# Patient Record
Sex: FEMALE | Employment: FULL TIME | URBAN - METROPOLITAN AREA
[De-identification: names, ages, dates, MRNs, and addresses within clinical notes are randomized per-mention and may not be internally consistent; named-entity substitution may affect disease eponyms.]

---

## 2017-04-04 ENCOUNTER — OFFICE VISIT (OUTPATIENT)
Dept: OPTOMETRY | Facility: CLINIC | Age: 42
End: 2017-04-04
Payer: COMMERCIAL

## 2017-04-04 DIAGNOSIS — H52.03 HYPEROPIA, BILATERAL: Primary | ICD-10-CM

## 2017-04-04 DIAGNOSIS — H52.4 PRESBYOPIA: ICD-10-CM

## 2017-04-04 PROCEDURE — 92015 DETERMINE REFRACTIVE STATE: CPT | Performed by: OPTOMETRIST

## 2017-04-04 PROCEDURE — 92004 COMPRE OPH EXAM NEW PT 1/>: CPT | Performed by: OPTOMETRIST

## 2017-04-04 ASSESSMENT — TONOMETRY
OS_IOP_MMHG: 13
OD_IOP_MMHG: 13
IOP_METHOD: APPLANATION

## 2017-04-04 ASSESSMENT — REFRACTION_MANIFEST
OS_CYLINDER: SPHERE
OD_SPHERE: +1.50
OD_CYLINDER: SPHERE
METHOD_AUTOREFRACTION: 1
OS_ADD: +1.50
OD_SPHERE: +1.50
OS_SPHERE: +1.25
OD_ADD: +1.50
OS_SPHERE: +1.00

## 2017-04-04 ASSESSMENT — KERATOMETRY
OS_AXISANGLE2_DEGREES: 25
OS_K1POWER_DIOPTERS: 44.50
OD_K1POWER_DIOPTERS: 44.50
OD_K2POWER_DIOPTERS: 45.25
OD_AXISANGLE2_DEGREES: 155
OS_K2POWER_DIOPTERS: 45.25

## 2017-04-04 ASSESSMENT — VISUAL ACUITY
OS_SC: 20/70
OD_SC: 20/30
OD_SC+: -1
METHOD: SNELLEN - LINEAR
OS_SC+: -1
OD_SC: 20/70-2
OS_SC: 20/25

## 2017-04-04 ASSESSMENT — CONF VISUAL FIELD
OS_NORMAL: 1
OD_NORMAL: 1

## 2017-04-04 ASSESSMENT — EXTERNAL EXAM - LEFT EYE: OS_EXAM: NORMAL

## 2017-04-04 ASSESSMENT — SLIT LAMP EXAM - LIDS
COMMENTS: NORMAL
COMMENTS: NORMAL

## 2017-04-04 ASSESSMENT — CUP TO DISC RATIO
OD_RATIO: 0.2
OS_RATIO: 0.2

## 2017-04-04 ASSESSMENT — EXTERNAL EXAM - RIGHT EYE: OD_EXAM: NORMAL

## 2017-04-04 NOTE — MR AVS SNAPSHOT
"              After Visit Summary   4/4/2017    Inocente Gutierrez    MRN: [id number]           Patient Information     Date Of Birth          1975        Visit Information        Provider Department      4/4/2017 2:00 PM Suzette Lawson, K[facility]        Today's Diagnoses     Hyperopia, bilateral    -  1    Presbyopia          Care Instructions    Patient was advised of today's exam findings.  Reading glasses advised  Return in 1 year for eye exam    Suzette Lawson O.D.  [facility]   [address] [place] [address]  [phone]          Follow-ups after your visit        Who to contact     If you have questions or need follow up information about today's clinic visit or your schedule please contact [facility] directly at [phone].  Normal or non-critical lab and imaging results will be communicated to you by MyChart, letter or phone within 4 business days after the clinic has received the results. If you do not hear from us within 7 days, please contact the clinic through MyChart or phone. If you have a critical or abnormal lab result, we will notify you by phone as soon as possible.  Submit refill requests through [employer] or call your pharmacy and they will forward the refill request to us. Please allow 3 business days for your refill to be completed.          Additional Information About Your Visit        MyChart Information     [employer] lets you send messages to your doctor, view your test results, renew your prescriptions, schedule appointments and more. To sign up, go to [email or web address] . Click on \"Log in\" on the left side of the screen, which will take you to the Welcome page. Then click on \"Sign up Now\" on the right side of the page.     You will be asked to enter the access code listed below, as well as some personal information. Please follow the directions to create your username and password.     Your access code is: " [email or web address]  Expires: 7/3/2017  3:06 PM     Your access code will  in 90 days. If you need help or a new code, please call your [facility] or [phone].        Care EveryWhere ID     This is your Care EveryWhere ID. This could be used by other organizations to access your [place] medical records  [id number]         Blood Pressure from Last 3 Encounters:   10/27/11 143/86    Weight from Last 3 Encounters:   No data found for Wt              Today, you had the following     No orders found for display       Primary Care Provider Office Phone # Fax #    [facility] [phone] [address] [facility] [address]        Thank you!     Thank you for choosing [facility]  for your care. Our goal is always to provide you with excellent care. Hearing back from our patients is one way we can continue to improve our services. Please take a few minutes to complete the written survey that you may receive in the mail after your visit with us. Thank you!             Your Updated Medication List - Protect others around you: Learn how to safely use, store and throw away your medicines at www.disposemymeds.org.          This list is accurate as of: 17  3:06 PM.  Always use your most recent med list.                   Brand Name Dispense Instructions for use    PROZAC 40 MG capsule   Generic drug:  FLUoxetine      Take 40 mg by mouth daily.

## 2017-04-04 NOTE — PATIENT INSTRUCTIONS
Patient was advised of today's exam findings.  Reading glasses advised  Return in 1 year for eye exam    Suzette Lawson O.D.  [facility]   [address] [place], [place] [id number] [phone]

## 2017-04-20 NOTE — PROGRESS NOTES
SUBJECTIVE:                                                    Inocente Gutierrez is a 41 year old female who presents to clinic today for the following health issues:    New Patient/Transfer of Care discuss med check and refill .      Depression:  Chronic. Stable.   Prozac dose works very well. 30 mg daily for a long time.  Stable for years per patient. This dose seems to work the best for her.   Denies suicidal or homicidal thoughts.  Patient instructed to go to the emergency room or call 911 if these occur.      Wrist pain:  Was told to have surgery for carpel surgery--but insurance lapsed.  She has been having ongoing pain and symptoms. Already had nerve testing done/EMG.   Needs referral. She would like to be assessed for surgery.  Failed braces and conservative treatments.   Is both wrists. Pain for many years.   Takes ibuprofen for back pain, wrist pain, or painful period cramping.  Would like refilled.       SH:  3 pregnancies.   Tobacco use: Smokes about a pack every 3 days.  Has tried to quit before.  Has cut back on her smoking.     Has quit for years at a time. Does not feels he needs to quit.  Can quit when she wants to.   Mom has copd. Patient is aware of reasons to quit.       Pap--has been several years. Is due. Will schedule.           Problem list and histories reviewed & adjusted, as indicated.  Additional history: as documented    Patient Active Problem List   Diagnosis     Mild episode of recurrent major depressive disorder (H)     Pain in both wrists     Past Surgical History:   Procedure Laterality Date     APPENDECTOMY       TONSILLECTOMY         Social History   Substance Use Topics     Smoking status: Current Every Day Smoker     Packs/day: 1.00     Years: 20.00     Types: Cigarettes     Smokeless tobacco: Not on file      Comment: per pt smokes a pack every 3 days     Alcohol use Yes      Comment: OCC     Family History   Problem Relation Age of Onset     DIABETES Mother      Chronic  "Obstructive Pulmonary Disease Mother      Parkinsonism Mother      greene     Glaucoma No family hx of      Macular Degeneration No family hx of          Current Outpatient Prescriptions   Medication Sig Dispense Refill     FLUoxetine (PROZAC) 10 MG capsule Take 1 capsule (10 mg) by mouth daily Take 3 capsules once daily. 30 mg total. 90 capsule 5     ibuprofen (ADVIL/MOTRIN) 800 MG tablet Take 1 tablet (800 mg) by mouth every 8 hours as needed for moderate pain 90 tablet 11     [DISCONTINUED] FLUoxetine (PROZAC) 10 MG capsule Take 10 mg by mouth daily Per pt takes 30 mg, takes 3 tabs all at once.       [DISCONTINUED] FLUoxetine (PROZAC) 40 MG capsule Take 40 mg by mouth daily.       No Known Allergies    ROS:  Constitutional, HEENT, cardiovascular, pulmonary, GI, , musculoskeletal, neuro, skin, endocrine and psych systems are negative, except as otherwise noted.    OBJECTIVE:                                                    /84  Pulse 94  Temp 98.7  F (37.1  C) (Oral)  Ht 4' 11\" (1.499 m)  Wt 127 lb (57.6 kg)  SpO2 98%  Breastfeeding? No  BMI 25.65 kg/m2  Body mass index is 25.65 kg/(m^2).  GENERAL: healthy, alert and no distress  RESP: lungs clear to auscultation - no rales, rhonchi or wheezes  CV: regular rate and rhythm, normal S1 S2, no S3 or S4, no murmur, click or rub, no peripheral edema and peripheral pulses strong  MS: no gross musculoskeletal defects noted, no edema  SKIN: no suspicious lesions or rashes  NEURO: Normal strength and tone, mentation intact and speech normal  PSYCH: mentation appears normal, affect normal/bright         ASSESSMENT/PLAN:                                                    ASSESSMENT / PLAN:  (F33.0) Mild episode of recurrent major depressive disorder (H)  (primary encounter diagnosis)  Comment: stable  Plan: FLUoxetine (PROZAC) 10 MG capsule        Recheck 6 months via phone or mychart or mail (phq9 score)  Recheck 12 months clinic, sooner if needed      (N94.6) " Dysmenorrhea  Comment:   Plan: ibuprofen (ADVIL/MOTRIN) 800 MG tablet            (M25.531,  M25.532) Pain in both wrists  Comment: see hpi  Plan: ORTHOPEDICS ADULT REFERRAL                Christa Rajan PA-C  [facility]

## 2017-04-21 ENCOUNTER — OFFICE VISIT (OUTPATIENT)
Dept: FAMILY MEDICINE | Facility: CLINIC | Age: 42
End: 2017-04-21
Payer: COMMERCIAL

## 2017-04-21 VITALS
BODY MASS INDEX: 25.6 KG/M2 | DIASTOLIC BLOOD PRESSURE: 84 MMHG | OXYGEN SATURATION: 98 % | HEIGHT: 59 IN | HEART RATE: 94 BPM | SYSTOLIC BLOOD PRESSURE: 136 MMHG | TEMPERATURE: 98.7 F | WEIGHT: 127 LBS

## 2017-04-21 DIAGNOSIS — F33.0 MILD EPISODE OF RECURRENT MAJOR DEPRESSIVE DISORDER (H): Primary | ICD-10-CM

## 2017-04-21 DIAGNOSIS — M25.532 PAIN IN BOTH WRISTS: ICD-10-CM

## 2017-04-21 DIAGNOSIS — N94.6 DYSMENORRHEA: ICD-10-CM

## 2017-04-21 DIAGNOSIS — M25.531 PAIN IN BOTH WRISTS: ICD-10-CM

## 2017-04-21 PROCEDURE — 99203 OFFICE O/P NEW LOW 30 MIN: CPT | Performed by: PHYSICIAN ASSISTANT

## 2017-04-21 RX ORDER — IBUPROFEN 800 MG/1
800 TABLET, FILM COATED ORAL EVERY 8 HOURS PRN
Qty: 90 TABLET | Refills: 11 | Status: SHIPPED | OUTPATIENT
Start: 2017-04-21 | End: 2018-05-08

## 2017-04-21 RX ORDER — IBUPROFEN 800 MG/1
800 TABLET, FILM COATED ORAL EVERY 8 HOURS PRN
COMMUNITY
End: 2017-04-21

## 2017-04-21 RX ORDER — FLUOXETINE 10 MG/1
10 CAPSULE ORAL DAILY
COMMUNITY
End: 2017-04-21

## 2017-04-21 RX ORDER — FLUOXETINE 10 MG/1
10 CAPSULE ORAL DAILY
Qty: 90 CAPSULE | Refills: 5 | Status: SHIPPED | OUTPATIENT
Start: 2017-04-21 | End: 2018-04-30

## 2017-04-21 NOTE — NURSING NOTE
"Chief Complaint   Patient presents with     Establish Care     establish care with PCP prozac, ibuprofen med check and refill       Initial /86  Pulse 94  Temp 98.7  F (37.1  C) (Oral)  Ht 4' 11\" (1.499 m)  Wt 127 lb (57.6 kg)  SpO2 98%  Breastfeeding? No  BMI 25.65 kg/m2 Estimated body mass index is 25.65 kg/(m^2) as calculated from the following:    Height as of this encounter: 4' 11\" (1.499 m).    Weight as of this encounter: 127 lb (57.6 kg).  Medication Reconciliation: complete      Danna Clay MA    "

## 2017-04-21 NOTE — MR AVS SNAPSHOT
After Visit Summary   4/21/2017    Inocente Gutierrez    MRN: [id number]           Patient Information     Date Of Birth          1975        Visit Information        Provider Department      4/21/2017 1:40 PM Christa Rajan PA-C [facility]        Today's Diagnoses     Mild episode of recurrent major depressive disorder (H)    -  1    Dysmenorrhea        Pain in both wrists           Follow-ups after your visit        Additional Services     ORTHOPEDICS ADULT REFERRAL       Your provider has referred you to: [facility]: [facility] [phone]   [email or web address]    Please be aware that coverage of these services is subject to the terms and limitations of your health insurance plan.  Call member services at your health plan with any benefit or coverage questions.      Please bring the following to your appointment:    >>   Any x-rays, CTs or MRIs which have been performed.  Contact the facility where they were done to arrange for  prior to your scheduled appointment.    >>   List of current medications   >>   This referral request   >>   Any documents/labs given to you for this referral                  Your next 10 appointments already scheduled     May 01, 2017 11:30 AM CDT   PHYSICAL with Deangelo Cedillo MD   [facility]    [address] [address] [phone]              Who to contact     If you have questions or need follow up information about today's clinic visit or your schedule please contact [facility] directly at [phone].  Normal or non-critical lab and imaging results will be communicated to you by MyChart, letter or phone within 4 business days after the clinic has received the results. If you do not hear from us within 7 days, please contact the clinic through MyChart or phone. If you have a critical or abnormal lab result, we will  "notify you by phone as soon as possible.  Submit refill requests through [employer] or call your pharmacy and they will forward the refill request to us. Please allow 3 business days for your refill to be completed.          Additional Information About Your Visit        [employer] Information     [employer] lets you send messages to your doctor, view your test results, renew your prescriptions, schedule appointments and more. To sign up, go to [email or web address] . Click on \"Log in\" on the left side of the screen, which will take you to the Welcome page. Then click on \"Sign up Now\" on the right side of the page.     You will be asked to enter the access code listed below, as well as some personal information. Please follow the directions to create your username and password.     Your access code is: [email or web address]  Expires: 7/3/2017  3:06 PM     Your access code will  in 90 days. If you need help or a new code, please call your Mobile clinic or [phone].        Care EveryWhere ID     This is your Care EveryWhere ID. This could be used by other organizations to access your [place] medical records  [id number]        Your Vitals Were     Pulse Temperature Height Pulse Oximetry Breastfeeding? BMI (Body Mass Index)    94 98.7  F (37.1  C) (Oral) 4' 11\" (1.499 m) 98% No 25.65 kg/m2       Blood Pressure from Last 3 Encounters:   17 162/86   10/27/11 143/86    Weight from Last 3 Encounters:   17 127 lb (57.6 kg)              We Performed the Following     ORTHOPEDICS ADULT REFERRAL          Today's Medication Changes          These changes are accurate as of: 17  2:40 PM.  If you have any questions, ask your nurse or doctor.               These medicines have changed or have updated prescriptions.        Dose/Directions    FLUoxetine 10 MG capsule   Commonly known as:  PROZAC   This may have changed:    - additional instructions  - Another medication with the same name was removed. Continue taking " this medication, and follow the directions you see here.   Used for:  Mild episode of recurrent major depressive disorder (H)   Changed by:  Christa Rajan PA-C        Dose:  10 mg   Take 1 capsule (10 mg) by mouth daily Take 3 capsules once daily. 30 mg total.   Quantity:  90 capsule   Refills:  5            Where to get your medicines      These medications were sent to [place] [address] [place], [place] - [address]  [address] [address]     Phone:  [phone]     FLUoxetine 10 MG capsule    ibuprofen 800 MG tablet                Primary Care Provider Office Phone # Fax #    [facility] [phone] [address] [facility] [address]        Thank you!     Thank you for choosing [facility]  for your care. Our goal is always to provide you with excellent care. Hearing back from our patients is one way we can continue to improve our services. Please take a few minutes to complete the written survey that you may receive in the mail after your visit with us. Thank you!             Your Updated Medication List - Protect others around you: Learn how to safely use, store and throw away your medicines at www.disposemymeds.org.          This list is accurate as of: 4/21/17  2:40 PM.  Always use your most recent med list.                   Brand Name Dispense Instructions for use    FLUoxetine 10 MG capsule    PROZAC    90 capsule    Take 1 capsule (10 mg) by mouth daily Take 3 capsules once daily. 30 mg total.       ibuprofen 800 MG tablet    ADVIL/MOTRIN    90 tablet    Take 1 tablet (800 mg) by mouth every 8 hours as needed for moderate pain

## 2017-04-25 ENCOUNTER — TELEPHONE (OUTPATIENT)
Dept: FAMILY MEDICINE | Facility: CLINIC | Age: 42
End: 2017-04-25

## 2017-04-25 DIAGNOSIS — F33.0 MILD EPISODE OF RECURRENT MAJOR DEPRESSIVE DISORDER (H): Primary | ICD-10-CM

## 2017-04-25 NOTE — TELEPHONE ENCOUNTER
Patient was in to see Christa Rajan and was diagnosed with Depression. Please complete PHA-9 and DAP. Thank you

## 2017-04-25 NOTE — TELEPHONE ENCOUNTER
Left message on answering machine for patient to call back.  [phone]  Tata BLANCHARDN, RN, CPN

## 2017-04-26 ASSESSMENT — PATIENT HEALTH QUESTIONNAIRE - PHQ9: SUM OF ALL RESPONSES TO PHQ QUESTIONS 1-9: 3

## 2017-04-26 NOTE — PROGRESS NOTES
"SUBJECTIVE:  Inocente Gutierrez is a 41 year old female who is seen in consultation at the request of Christa Law, for Bilateral hand numbness and tingling and pain x 3 years. L>R. She has tried bracing at night. Suspected cause: Due to work activities. She is currently laid off.      Job description: Construction     Present symptoms: numbness in bilateral radial 4 fingers and can extend up the arm, bilateral middle fingers will turn \"white\" occasionally not necessarily when its cold, painful when cold, dropping objects, weakness.      Patients past medical, surgical, social and family histories reviewed.      Orthopedic PMH: none    PAST MEDICAL HISTORY:   Past Medical History:   Diagnosis Date     Depressive disorder      Hypertension      Uncomplicated asthma      PAST SURGICAL HISTORY:   Past Surgical History:   Procedure Laterality Date     APPENDECTOMY       TONSILLECTOMY       FAMILY HISTORY:   Family History   Problem Relation Age of Onset     DIABETES Mother      Chronic Obstructive Pulmonary Disease Mother      Parkinsonism Mother      greene     Glaucoma No family hx of      Macular Degeneration No family hx of      SOCIAL HISTORY:   Social History   Substance Use Topics     Smoking status: Current Every Day Smoker     Packs/day: 1.00     Years: 20.00     Types: Cigarettes     Smokeless tobacco: Not on file      Comment: per pt smokes a pack every 3 days     Alcohol use Yes      Comment: OCC     REVIEW OF SYSTEMS:  CONSTITUTIONAL:  NEGATIVE for fever, chills, change in weight  INTEGUMENTARY/SKIN:  NEGATIVE for worrisome rashes, moles or lesions  EYES:  NEGATIVE for vision changes or irritation  ENT/MOUTH:  NEGATIVE for ear, mouth and throat problems  RESP:  NEGATIVE for significant cough or SOB  BREAST:  NEGATIVE for masses, tenderness or discharge  CV:  NEGATIVE for chest pain, palpitations or peripheral edema  GI:  NEGATIVE for nausea, abdominal pain, heartburn, or change in bowel habits  :  Negative "   MUSCULOSKELETAL:  See HPI above  NEURO:  NEGATIVE for weakness, dizziness or paresthesias  ENDOCRINE:  NEGATIVE for temperature intolerance, skin/hair changes  HEME/ALLERGY/IMMUNE:  NEGATIVE for bleeding problems  PSYCHIATRIC:  NEGATIVE for changes in mood or affect    EXAM:  /83 (BP Location: Right arm, Patient Position: Chair, Cuff Size: Adult Regular)  Pulse 79  Resp 14  Wt 59.3 kg (130 lb 12.8 oz)  SpO2 100%  BMI 26.42 kg/m2  GENERAL APPEARANCE: healthy, alert and no distress   GAIT: NORMAL  PSYCH:  mentation appears normal and affect normal/bright  SKIN: no suspicious lesions or rashes  NEURO: Negative thenar fasciculations (bilateral).  Thenar atrophy: none (bilateral).   Sensation diminished in radial 3 digits and half of the 4th digit (L), diminished in index finger only (R)   reflexes normal in upper extremities.   Vascular: Good capp refill and pulses    MUSCULOSKELETAL:    Range of Motion wrist, digits: All Normal  Special tests: Tinel's positive (bilateral), Phalen's positive after 10 seconds (bilateral).    EMG: Obtained on 4/15/15 from [facility] showed: moderate bilateral carpal tunnel syndrome.     ASSESSMENT/PLAN:  1. Moderate bilateral carpal tunnel syndrome     We talked about the options, which included activity modification,splinting, corticosteroid injection, medrol dose pack, and CTR. We talked about the nature of the procedure, risks, and recovery time. We decided to proceed with bilateral corticosteroid injection under US guidance.  She was sent for this.    Return to clinic PRN.    TADEO Christy MD  Dept. Orthopedic Surgery  [facility]    This document serves as a record of the services and decisions personally performed and made by Dr. TADEO Christy MD. It was created on his behalf by Sonja Handley, a trained medical scribe. The creation of this record is based on the provider's personal observations and the statements of the patient. This document has been  checked and approved by the attending provider.   Sonja Handley April 27, 2017 11:04 AM

## 2017-04-27 ENCOUNTER — OFFICE VISIT (OUTPATIENT)
Dept: ORTHOPEDICS | Facility: CLINIC | Age: 42
End: 2017-04-27
Payer: COMMERCIAL

## 2017-04-27 VITALS
DIASTOLIC BLOOD PRESSURE: 83 MMHG | BODY MASS INDEX: 26.42 KG/M2 | SYSTOLIC BLOOD PRESSURE: 149 MMHG | OXYGEN SATURATION: 100 % | RESPIRATION RATE: 14 BRPM | HEART RATE: 79 BPM | WEIGHT: 130.8 LBS

## 2017-04-27 DIAGNOSIS — R20.2 NUMBNESS AND TINGLING IN BOTH HANDS: Primary | ICD-10-CM

## 2017-04-27 DIAGNOSIS — G56.03 BILATERAL CARPAL TUNNEL SYNDROME: ICD-10-CM

## 2017-04-27 DIAGNOSIS — R20.0 NUMBNESS AND TINGLING IN BOTH HANDS: Primary | ICD-10-CM

## 2017-04-27 PROCEDURE — 99243 OFF/OP CNSLTJ NEW/EST LOW 30: CPT | Performed by: ORTHOPAEDIC SURGERY

## 2017-04-27 ASSESSMENT — PAIN SCALES - GENERAL: PAINLEVEL: NO PAIN (1)

## 2017-04-27 NOTE — MR AVS SNAPSHOT
After Visit Summary   4/27/2017    Inocente Gutierrez    MRN: [id number]           Patient Information     Date Of Birth          1975        Visit Information        Provider Department      4/27/2017 10:15 AM Lucius Christy MD [facility]        Today's Diagnoses     Numbness and tingling in both hands    -  1    Bilateral carpal tunnel syndrome          Care Instructions    Please remember to call and schedule a follow up appointment if one was recommended at your earliest convenience.   Orthopedics CLINIC HOURS TELEPHONE NUMBER   Lucius Christy M.D.      Hamida Winslow LPN Tuesday 8 am -5 pm    1st & 3rd Wednesday  1-4pm [place] 2nd & 4th Wednesday  8 am -11 pm / [place]  1-4pm / [place] Thursday 8 am -5 pm   Specialty schedulers:   [phone] to make an appointment with any Specialty Provider.   Main Clinic:   [phone] to make an appointment with your primary provider   Urgent Care locations:    [facility] Monday-Friday 5 pm - 9 pm  Saturday-Sunday 9 am -5pm      Monday-Friday 11 am - 9 pm  Saturday 9 am - 5 pm [phone]     If SURGERY has been recommended, please call our Specialty Schedulers at [phone] to schedule your procedure.    If you need a medication refill, please contact your pharmacy. Please allow 3 business days for your refill to be completed.  Use [employer] (secure e-mail communication and access to your chart) to send a message or to make an appointment. Please ask how you can sign up for [employer].  Hamida Winslow LPN            Follow-ups after your visit        Additional Services     ORTHO  REFERRAL       [facility] is referring you to the Orthopedic  Services at [facility].       The  Representative will assist you in the coordination of your Orthopedic and Musculoskeletal Care as prescribed by your physician.    The   Representative will call you within 1 business day to help schedule your appointment, or you may contact the Pending sale to [facility] Representative at:    All areas ~ [phone]     Type of Referral : Surgical / Specialist  Dr. Tyrese Bocanegra @ [facility]      Timeframe requested: 1 - 2 weeks  Procedure requested: Bilateral steroid injection of carpal tunnels under image guidance.    Coverage of these services is subject to the terms and limitations of your health insurance plan.  Please call member services at your health plan with any benefit or coverage questions.      If X-rays, CT or MRI's have been performed, please contact the facility where they were done to arrange for , prior to your scheduled appointment.  Please bring this referral request to your appointment and present it to your specialist.                  Your next 10 appointments already scheduled     May 01, 2017 11:30 AM CDT   PHYSICAL with Deangelo Cedillo MD   [facility] ([facility]    [address] [address] [phone]              Who to contact     If you have questions or need follow up information about today's clinic visit or your schedule please contact [facility] directly at [phone].  Normal or non-critical lab and imaging results will be communicated to you by MyChart, letter or phone within 4 business days after the clinic has received the results. If you do not hear from us within 7 days, please contact the clinic through MyChart or phone. If you have a critical or abnormal lab result, we will notify you by phone as soon as possible.  Submit refill requests through [employer] or call your pharmacy and they will forward the refill request to us. Please allow 3 business days for your refill to be completed.          Additional Information About Your Visit        Care EveryWhere ID     This is your Care EveryWhere ID. This could be used by other organizations  to access your [place] medical records  [id number]        Your Vitals Were     Pulse Respirations Pulse Oximetry BMI (Body Mass Index)          79 14 100% 26.42 kg/m2         Blood Pressure from Last 3 Encounters:   04/27/17 149/83   04/21/17 136/84   10/27/11 143/86    Weight from Last 3 Encounters:   04/27/17 59.3 kg (130 lb 12.8 oz)   04/21/17 57.6 kg (127 lb)              We Performed the Following     ORTHO  REFERRAL        Primary Care Provider Office Phone # Fax #    [facility] [phone] [address] [facility] [address]        Thank you!     Thank you for choosing [facility]  for your care. Our goal is always to provide you with excellent care. Hearing back from our patients is one way we can continue to improve our services. Please take a few minutes to complete the written survey that you may receive in the mail after your visit with us. Thank you!             Your Updated Medication List - Protect others around you: Learn how to safely use, store and throw away your medicines at www.disposemymeds.org.          This list is accurate as of: 4/27/17 12:46 PM.  Always use your most recent med list.                   Brand Name Dispense Instructions for use    FLUoxetine 10 MG capsule    PROZAC    90 capsule    Take 1 capsule (10 mg) by mouth daily Take 3 capsules once daily. 30 mg total.       ibuprofen 800 MG tablet    ADVIL/MOTRIN    90 tablet    Take 1 tablet (800 mg) by mouth every 8 hours as needed for moderate pain

## 2017-04-27 NOTE — NURSING NOTE
"Chief Complaint   Patient presents with     Musculoskeletal Problem     Onset: 3 years ago. Pain, numbness all the time. 3rd digit on both hands turn white on occassion. wears wrist braces on both hands regularly.       Initial /83 (BP Location: Right arm, Patient Position: Chair, Cuff Size: Adult Regular)  Pulse 79  Resp 14  Wt 59.3 kg (130 lb 12.8 oz)  SpO2 100%  BMI 26.42 kg/m2 Estimated body mass index is 26.42 kg/(m^2) as calculated from the following:    Height as of 4/21/17: 1.499 m (4' 11\").    Weight as of this encounter: 59.3 kg (130 lb 12.8 oz).  Medication Reconciliation: complete   Hamida Winslow LPN      "

## 2017-04-27 NOTE — PATIENT INSTRUCTIONS
Please remember to call and schedule a follow up appointment if one was recommended at your earliest convenience.   Orthopedics CLINIC HOURS TELEPHONE NUMBER   Lucius Christy M.D.      Hamida Winslow LPN Tuesday 8 am -5 pm    1st & 3rd Wednesday  1-4pm [place] 2nd & 4th Wednesday  8 am -11 pm / [place]  1-4pm / [place] Thursday 8 am -5 pm   Specialty schedulers:   [phone] to make an appointment with any Specialty Provider.   Main Clinic:   [phone] to make an appointment with your primary provider   Urgent Care locations:    [facility] Monday-Friday 5 pm - 9 pm  Saturday-Sunday 9 am -5pm      Monday-Friday 11 am - 9 pm  Saturday 9 am - 5 pm [phone]     If SURGERY has been recommended, please call our Specialty Schedulers at [phone] to schedule your procedure.    If you need a medication refill, please contact your pharmacy. Please allow 3 business days for your refill to be completed.  Use TripleLiftt (secure e-mail communication and access to your chart) to send a message or to make an appointment. Please ask how you can sign up for Right90.  Hamida Winslow LPN

## 2017-05-01 ENCOUNTER — OFFICE VISIT (OUTPATIENT)
Dept: OBGYN | Facility: CLINIC | Age: 42
End: 2017-05-01
Payer: COMMERCIAL

## 2017-05-01 VITALS
BODY MASS INDEX: 25.84 KG/M2 | HEART RATE: 87 BPM | HEIGHT: 59 IN | DIASTOLIC BLOOD PRESSURE: 73 MMHG | WEIGHT: 128.2 LBS | OXYGEN SATURATION: 99 % | SYSTOLIC BLOOD PRESSURE: 138 MMHG | TEMPERATURE: 99 F

## 2017-05-01 DIAGNOSIS — Z01.419 ENCOUNTER FOR GYNECOLOGICAL EXAMINATION WITHOUT ABNORMAL FINDING: Primary | ICD-10-CM

## 2017-05-01 PROCEDURE — G0476 HPV COMBO ASSAY CA SCREEN: HCPCS | Performed by: OBSTETRICS & GYNECOLOGY

## 2017-05-01 PROCEDURE — 99396 PREV VISIT EST AGE 40-64: CPT | Performed by: OBSTETRICS & GYNECOLOGY

## 2017-05-01 PROCEDURE — 87624 HPV HI-RISK TYP POOLED RSLT: CPT | Performed by: OBSTETRICS & GYNECOLOGY

## 2017-05-01 PROCEDURE — G0145 SCR C/V CYTO,THINLAYER,RESCR: HCPCS | Performed by: OBSTETRICS & GYNECOLOGY

## 2017-05-01 RX ORDER — ALBUTEROL SULFATE 90 UG/1
2 AEROSOL, METERED RESPIRATORY (INHALATION)
COMMUNITY
Start: 2015-02-24 | End: 2018-05-08

## 2017-05-01 NOTE — LETTER
May 10, 2017    Inocente Gutierrez  [address] [place] [address]    Dear Inocente,  We are happy to inform you that your PAP smear result from 5/1/17 is normal.  We are now able to do a follow up test on PAP smears. The DNA test is for HPV (Human Papilloma Virus). Cervical cancer is closely linked with certain types of HPV. Your result showed no evidence of high risk HPV.  Therefore we recommend you return in 3 years for your next pap smear and HPV test.  You will still need to return to the clinic every year for an annual exam and other preventive tests.  Please contact the clinic at [phone] with any questions.  Sincerely,    Deangelo Cedillo MD/karo

## 2017-05-01 NOTE — MR AVS SNAPSHOT
After Visit Summary   5/1/2017    Inocente Gutierrez    MRN: [id number]           Patient Information     Date Of Birth          1975        Visit Information        Provider Department      5/1/2017 11:30 AM Deangelo Cedillo MD [facility]        Today's Diagnoses     Encounter for gynecological examination without abnormal finding    -  1      Care Instructions                                                         If you have any questions regarding your visit, Please contact your care team.    Women s Health CLINIC HOURS TELEPHONE NUMBER   MD Preethi Gregorio CMA Lisa -    SAJAN Bland RN       Monday:       7:30-4:30 [place]  Wednesday:       7:30-4:30 [place]  Thursday:       7:30-1:30 [place]  Friday:       7:30-11:30 [facility]  [address] [place], [place]  [id number] [phone] ask for Women's Clinic    [facility]  [address] [place] [address]  [phone] ask for [facility]    Imaging Scheduling for [place]:  [phone]    Imaging Scheduling for [place]: [phone]       Urgent Care locations:    [facility] Saturday and Sunday   9 am - 5 pm    Monday-Friday   12 pm - 8 pm  Saturday and Sunday   9 am - 5 pm   [phone]     [facility] Labor and Delivery:  [phone]    If you need a medication refill, please contact your pharmacy. Please allow 3 business days for your refill to be completed.  As always, Thank you for trusting us with your healthcare needs!            Follow-ups after your visit        Future tests that were ordered for you today     Open Future Orders        Priority Expected Expires Ordered    LIPID REFLEX TO DIRECT LDL PANEL Routine  6/30/2017 5/1/2017    GLUCOSE Routine  6/30/2017 5/1/2017    MA SCREENING DIGITAL BILAT Routine  5/1/2018 5/1/2017            Who to contact     If you have  "questions or need follow up information about today's clinic visit or your schedule please contact [facility] directly at [phone].  Normal or non-critical lab and imaging results will be communicated to you by MyChart, letter or phone within 4 business days after the clinic has received the results. If you do not hear from us within 7 days, please contact the clinic through MyChart or phone. If you have a critical or abnormal lab result, we will notify you by phone as soon as possible.  Submit refill requests through [email or web address] or call your pharmacy and they will forward the refill request to us. Please allow 3 business days for your refill to be completed.          Additional Information About Your Visit        Care EveryWhere ID     This is your Care EveryWhere ID. This could be used by other organizations to access your [facility] medical records  [id number]        Your Vitals Were     Pulse Temperature Height Last Period Pulse Oximetry BMI (Body Mass Index)    87 99  F (37.2  C) (Oral) 4' 10.94\" (1.497 m) 04/14/2017 99% 25.95 kg/m2       Blood Pressure from Last 3 Encounters:   05/01/17 138/73   04/27/17 149/83   04/21/17 136/84    Weight from Last 3 Encounters:   05/01/17 128 lb 3.2 oz (58.2 kg)   04/27/17 130 lb 12.8 oz (59.3 kg)   04/21/17 127 lb (57.6 kg)              We Performed the Following     HPV High Risk Types DNA Cervical     Pap imaged thin layer screen with HPV - recommended age 30 - 65 years (select HPV order below)        Primary Care Provider Office Phone # Fax #    [facility] [phone] [address] [place] [address]        Thank you!     Thank you for choosing [facility]  for your care. Our goal is always to provide you with excellent care. Hearing back from our patients is one way we can continue to improve our services. Please take a few minutes to complete the written survey that you may receive in the mail after your " visit with us. Thank you!             Your Updated Medication List - Protect others around you: Learn how to safely use, store and throw away your medicines at www.disposemymeds.org.          This list is accurate as of: 5/1/17 12:14 PM.  Always use your most recent med list.                   Brand Name Dispense Instructions for use    albuterol 108 (90 BASE) MCG/ACT Inhaler    PROAIR HFA/PROVENTIL HFA/VENTOLIN HFA     Inhale 2 puffs into the lungs Reported on 5/1/2017       FLUoxetine 10 MG capsule    PROZAC    90 capsule    Take 1 capsule (10 mg) by mouth daily Take 3 capsules once daily. 30 mg total.       ibuprofen 800 MG tablet    ADVIL/MOTRIN    90 tablet    Take 1 tablet (800 mg) by mouth every 8 hours as needed for moderate pain

## 2017-05-01 NOTE — PROGRESS NOTES
"  Inocente is a 41 year old  here for annual exam. She is currently using vasectomy for birth control.      ROS: Ten point review of systems was reviewed and negative except the above.      Last paps:  No results found for: PAP  Last cholesterol: No results found for: CHOL]        Past Surgical History:   Procedure Laterality Date     APPENDECTOMY       TONSILLECTOMY       Past Medical History:   Diagnosis Date     Depressive disorder      Hypertension      Uncomplicated asthma         .   No Known Allergies    Family History   Problem Relation Age of Onset     DIABETES Mother      Chronic Obstructive Pulmonary Disease Mother      Parkinsonism Mother      greene     Glaucoma No family hx of      Macular Degeneration No family hx of      Social History     Social History     Marital status:      Spouse name: N/A     Number of children: N/A     Years of education: N/A     Occupational History     Not on file.     Social History Main Topics     Smoking status: Current Every Day Smoker     Packs/day: 1.00     Years: 20.00     Types: Cigarettes     Smokeless tobacco: Not on file      Comment: per pt smokes a pack every 3 days     Alcohol use Yes      Comment: OCC     Drug use: No     Sexual activity: Yes     Partners: Male     Birth control/ protection: Male Surgical     Other Topics Concern     Not on file     Social History Narrative           PE: /73  Pulse 87  Temp 99  F (37.2  C) (Oral)  Ht 4' 10.94\" (1.497 m)  Wt 128 lb 3.2 oz (58.2 kg)  LMP 2017  SpO2 99%  BMI 25.95 kg/m2  Body mass index is 25.95 kg/(m^2).    General Appearance:  healthy, alert, active, no distress  Cardiovascular:  Regular rate and Rhythm  Neck: Supple, no adenopathy and thyroid normal  Lungs:  Clear, without wheeze, rale or rhonchi  Breast: normal breast exam  Abdomen: Benign, Soft, flat, non-tender, No masses, organomegaly, No inguinal nodes and Bowel sounds normoactive.   Pelvic:       - Ext: Vulva and perineum " are normal without lesion, mass or discharge        - Urethra: normal without discharge or scarring no hypermobility       - Bladder: No tenderness, No masses       - Vagina: without discharge and rugated       - Cervix: normal       - Uterus:Normal shape, position and consistency       - Adnexa: Normal without masses or tenderness       - Rectal: deferred    A/P [facility],      -  I discussed the new pap recommendations regarding screening.  Explained the rationale for increased intervals between paps.  Questions asked and answered.  She does agree to this regiment.    - Pap was performed   -  BC: vasectomy     - Labs:   Orders Placed This Encounter   Procedures     MA SCREENING DIGITAL BILAT     LIPID REFLEX TO DIRECT LDL PANEL     GLUCOSE     Pap imaged thin layer screen with HPV - recommended age 30 - 65 years (select HPV order below)     HPV High Risk Types DNA Cervical       -  Encouraged healthy diet, regular exercise, self-breast examination.  Deangelo Cedillo MD FACOG

## 2017-05-01 NOTE — PATIENT INSTRUCTIONS
If you have any questions regarding your visit, Please contact your care team.    Women s Health CLINIC HOURS TELEPHONE NUMBER   MD Preethi Gregorio CMA Lisa -    SAJAN Bland RN       Monday:       7:30-4:30 [place]  Wednesday:       7:30-4:30 [place]  Thursday:       7:30-1:30 [place]  Friday:       7:30-11:30 [facility]  [address] [place]  [address]  [phone] ask for Women[facility]  [address] [place] [address]  [phone] ask for [facility]    Imaging Scheduling for [place]:  [phone]    Imaging Scheduling for [place]: [phone]       Urgent Care locations:    [facility] Saturday and Sunday   9 am - 5 pm    Monday-Friday   12 pm - 8 pm  Saturday and Sunday   9 am - 5 pm   [phone]     [facility] Labor and Delivery:  [phone]    If you need a medication refill, please contact your pharmacy. Please allow 3 business days for your refill to be completed.  As always, Thank you for trusting us with your healthcare needs!

## 2017-05-01 NOTE — NURSING NOTE
"Chief Complaint   Patient presents with     Physical     Pap       Initial /73  Pulse 87  Temp 99  F (37.2  C) (Oral)  Ht 4' 10.94\" (1.497 m)  Wt 128 lb 3.2 oz (58.2 kg)  LMP 04/14/2017  SpO2 99%  BMI 25.95 kg/m2 Estimated body mass index is 25.95 kg/(m^2) as calculated from the following:    Height as of this encounter: 4' 10.94\" (1.497 m).    Weight as of this encounter: 128 lb 3.2 oz (58.2 kg).  Medication Reconciliation: complete   Preethi Lang CMA      "

## 2017-05-02 ASSESSMENT — PATIENT HEALTH QUESTIONNAIRE - PHQ9: SUM OF ALL RESPONSES TO PHQ QUESTIONS 1-9: 7

## 2017-05-03 LAB
COPATH REPORT: NORMAL
PAP: NORMAL

## 2017-05-04 DIAGNOSIS — Z01.419 ENCOUNTER FOR GYNECOLOGICAL EXAMINATION WITHOUT ABNORMAL FINDING: ICD-10-CM

## 2017-05-04 LAB
CHOLEST SERPL-MCNC: 171 MG/DL
GLUCOSE SERPL-MCNC: 85 MG/DL (ref 70–99)
HDLC SERPL-MCNC: 91 MG/DL
LDLC SERPL CALC-MCNC: 69 MG/DL
NONHDLC SERPL-MCNC: 80 MG/DL
TRIGL SERPL-MCNC: 57 MG/DL

## 2017-05-04 PROCEDURE — 80061 LIPID PANEL: CPT | Performed by: OBSTETRICS & GYNECOLOGY

## 2017-05-04 PROCEDURE — 82947 ASSAY GLUCOSE BLOOD QUANT: CPT | Performed by: OBSTETRICS & GYNECOLOGY

## 2017-05-04 PROCEDURE — 36415 COLL VENOUS BLD VENIPUNCTURE: CPT | Performed by: OBSTETRICS & GYNECOLOGY

## 2017-05-05 LAB
FINAL DIAGNOSIS: NORMAL
HPV HR 12 DNA CVX QL NAA+PROBE: NEGATIVE
HPV16 DNA SPEC QL NAA+PROBE: NEGATIVE
HPV18 DNA SPEC QL NAA+PROBE: NEGATIVE
SPECIMEN DESCRIPTION: NORMAL

## 2017-05-10 ENCOUNTER — OFFICE VISIT (OUTPATIENT)
Dept: ORTHOPEDICS | Facility: CLINIC | Age: 42
End: 2017-05-10
Payer: COMMERCIAL

## 2017-05-10 VITALS
WEIGHT: 128 LBS | SYSTOLIC BLOOD PRESSURE: 135 MMHG | HEIGHT: 59 IN | DIASTOLIC BLOOD PRESSURE: 75 MMHG | BODY MASS INDEX: 25.8 KG/M2

## 2017-05-10 DIAGNOSIS — G56.03 BILATERAL CARPAL TUNNEL SYNDROME: Primary | ICD-10-CM

## 2017-05-10 PROCEDURE — 20526 THER INJECTION CARP TUNNEL: CPT | Mod: 50 | Performed by: FAMILY MEDICINE

## 2017-05-10 PROCEDURE — 76942 ECHO GUIDE FOR BIOPSY: CPT | Mod: RT | Performed by: FAMILY MEDICINE

## 2017-05-10 PROCEDURE — 76942 ECHO GUIDE FOR BIOPSY: CPT | Mod: LT | Performed by: FAMILY MEDICINE

## 2017-05-10 RX ORDER — TRIAMCINOLONE ACETONIDE 40 MG/ML
80 INJECTION, SUSPENSION INTRA-ARTICULAR; INTRAMUSCULAR ONCE
Qty: 2 ML | Refills: 0 | OUTPATIENT
Start: 2017-05-10 | End: 2017-05-10

## 2017-05-10 NOTE — MR AVS SNAPSHOT
After Visit Summary   5/10/2017    Inocente Gutierrez    MRN: [id number]           Patient Information     Date Of Birth          1975        Visit Information        Provider Department      5/10/2017 11:00 AM Tyrese Bocanegra,  [facility]        Today's Diagnoses     Bilateral carpal tunnel syndrome    -  1       Follow-ups after your visit        Your next 10 appointments already scheduled     May 11, 2017 11:00 AM CDT   MA SCREENING DIGITAL BILATERAL with [facility]   [facility]    [address] [facility] [address] [phone]           Do not use any powder, lotion or deodorant under your arms or on your breast. If you do, we will ask you to remove it before your exam.  Wear comfortable, two-piece clothing.  If you have any allergies, tell your care team.  Bring any previous mammograms from other facilities or have them mailed to the breast center.              Who to contact     If you have questions or need follow up information about today's clinic visit or your schedule please contact [facility] directly at [phone].  Normal or non-critical lab and imaging results will be communicated to you by MyChart, letter or phone within 4 business days after the clinic has received the results. If you do not hear from us within 7 days, please contact the clinic through MyChart or phone. If you have a critical or abnormal lab result, we will notify you by phone as soon as possible.  Submit refill requests through N42 or call your pharmacy and they will forward the refill request to us. Please allow 3 business days for your refill to be completed.          Additional Information About Your Visit        Care EveryWhere ID     This is your Care EveryWhere ID. This could be used by other organizations to access your [facility] medical records  [id number]        Your Vitals Were   "   Height Last Period BMI (Body Mass Index)             4' 11\" (1.499 m) 04/14/2017 25.85 kg/m2          Blood Pressure from Last 3 Encounters:   05/10/17 135/75   05/01/17 138/73   04/27/17 149/83    Weight from Last 3 Encounters:   05/10/17 128 lb (58.1 kg)   05/01/17 128 lb 3.2 oz (58.2 kg)   04/27/17 130 lb 12.8 oz (59.3 kg)              We Performed the Following     HC ARTHROCENTESIS ASPIR&/INJ INTERM JT/BURS W/US     TRIAMCINOLONE ACET INJ NOS          Today's Medication Changes          These changes are accurate as of: 5/10/17 12:44 PM.  If you have any questions, ask your nurse or doctor.               Start taking these medicines.        Dose/Directions    triamcinolone acetonide 40 MG/ML injection   Commonly known as:  KENALOG-40   Used for:  Bilateral carpal tunnel syndrome   Started by:  Tyrese Bocanegra DO        Dose:  80 mg   2 mLs (80 mg) by INTRA-ARTICULAR route once for 1 dose   Quantity:  2 mL   Refills:  0            Where to get your medicines      Some of these will need a paper prescription and others can be bought over the counter.  Ask your nurse if you have questions.     You don't need a prescription for these medications     triamcinolone acetonide 40 MG/ML injection                Primary Care Provider Office Phone # Fax #    [facility] [phone] [address] [address]        Thank you!     Thank you for choosing [facility]  for your care. Our goal is always to provide you with excellent care. Hearing back from our patients is one way we can continue to improve our services. Please take a few minutes to complete the written survey that you may receive in the mail after your visit with us. Thank you!             Your Updated Medication List - Protect others around you: Learn how to safely use, store and throw away your medicines at www.disposemymeds.org.          This list is accurate as of: " 5/10/17 12:44 PM.  Always use your most recent med list.                   Brand Name Dispense Instructions for use    albuterol 108 (90 BASE) MCG/ACT Inhaler    PROAIR HFA/PROVENTIL HFA/VENTOLIN HFA     Inhale 2 puffs into the lungs Reported on 5/1/2017       FLUoxetine 10 MG capsule    PROZAC    90 capsule    Take 1 capsule (10 mg) by mouth daily Take 3 capsules once daily. 30 mg total.       ibuprofen 800 MG tablet    ADVIL/MOTRIN    90 tablet    Take 1 tablet (800 mg) by mouth every 8 hours as needed for moderate pain       triamcinolone acetonide 40 MG/ML injection    KENALOG-40    2 mL    2 mLs (80 mg) by INTRA-ARTICULAR route once for 1 dose

## 2017-05-10 NOTE — NURSING NOTE
"Chief Complaint   Patient presents with     Musculoskeletal Problem     bilateral wrist - US injection       Initial /75  Ht 4' 11\" (1.499 m)  Wt 128 lb (58.1 kg)  LMP 04/14/2017  BMI 25.85 kg/m2 Estimated body mass index is 25.85 kg/(m^2) as calculated from the following:    Height as of this encounter: 4' 11\" (1.499 m).    Weight as of this encounter: 128 lb (58.1 kg).  Medication Reconciliation: complete     Conrad Rick ATC  "

## 2017-05-10 NOTE — PROGRESS NOTES
Inocente Gutierrez  :  1975  DOS: May 10, 2017  MRN: [id number]    Sports Medicine Clinic Procedure    Ultrasound Guided bilateral Carpal Tunnel Injection    Clinical History: Inocente Gutierrez is a 41 year old female who is seen in consultation at the request of Christa Law, for Bilateral hand numbness and tingling and pain x 3 years. L>R. She has tried bracing at night. Suspected cause: Due to work activities. She is currently laid off.     Diagnosis:   1. Bilateral carpal tunnel syndrome       Referring Physician: HAILE Christy MD  Technique: The risks of the procedure were explained to the patient.  A consent was signed for the right carpal tunnel injection.  The patient was evaluated with a World[employer] ultrasound machine using a 12 MHz linear probe.     The bilateral carpal tunnel was prepped and draped in a sterile manner.  Ultrasound identification of the carpal tunnel, median nerve, and regional structures in both long and short axis.  The location of adjacent neurovascular structures were noted.  The probe was placed in short axis to the bilateral carpal tunnel.  A 1.5 inch 25 gauge needle was placed under ultrasound guidance into the carpal tunnel.  A mixture of 1 ml's 1% lidocaine and 1 ml kenalog (40mg/ml) was injected without difficulty on short axis view, using an in plane approach around the median nerve.  The needle was removed and there was good hemostasis without complications.  There was ultrasound documentation of needle placement and injection.      Impression:  Successful bilateral carpal tunnel injection    Plan:  Follow up with Dr Christy as previously discussed.  Expectations and goals of CSI reviewed  Often 2-3 days for steroid effect, and can take up to two weeks for maximum effect  We discussed modified progressive pain-free activity as tolerated  Do not overuse in first two weeks if feeling better due to concern for vulnerability while steroid is working  Supportive care  reviewed  All questions were answered today  Contact us with additional questions or concerns  Signs and sx of concern reviewed      Tyrese Bocanegra DO, PARISH  Primary Care Sports Medicine  [facility]

## 2017-05-11 ENCOUNTER — RADIANT APPOINTMENT (OUTPATIENT)
Dept: MAMMOGRAPHY | Facility: CLINIC | Age: 42
End: 2017-05-11
Attending: OBSTETRICS & GYNECOLOGY
Payer: COMMERCIAL

## 2017-05-11 DIAGNOSIS — Z12.31 VISIT FOR SCREENING MAMMOGRAM: ICD-10-CM

## 2017-05-11 PROCEDURE — G0202 SCR MAMMO BI INCL CAD: HCPCS | Mod: TC

## 2017-05-22 ENCOUNTER — OFFICE VISIT (OUTPATIENT)
Dept: FAMILY MEDICINE | Facility: CLINIC | Age: 42
End: 2017-05-22
Payer: COMMERCIAL

## 2017-05-22 ENCOUNTER — RADIANT APPOINTMENT (OUTPATIENT)
Dept: GENERAL RADIOLOGY | Facility: CLINIC | Age: 42
End: 2017-05-22
Attending: PHYSICIAN ASSISTANT
Payer: COMMERCIAL

## 2017-05-22 ENCOUNTER — TELEPHONE (OUTPATIENT)
Dept: FAMILY MEDICINE | Facility: CLINIC | Age: 42
End: 2017-05-22

## 2017-05-22 VITALS
OXYGEN SATURATION: 98 % | TEMPERATURE: 98 F | WEIGHT: 125 LBS | BODY MASS INDEX: 25.25 KG/M2 | SYSTOLIC BLOOD PRESSURE: 138 MMHG | HEART RATE: 88 BPM | DIASTOLIC BLOOD PRESSURE: 86 MMHG

## 2017-05-22 DIAGNOSIS — M79.671 RIGHT FOOT PAIN: Primary | ICD-10-CM

## 2017-05-22 PROCEDURE — 99213 OFFICE O/P EST LOW 20 MIN: CPT | Performed by: PHYSICIAN ASSISTANT

## 2017-05-22 PROCEDURE — 73630 X-RAY EXAM OF FOOT: CPT | Mod: RT

## 2017-05-22 NOTE — PROGRESS NOTES
SUBJECTIVE:                                                    Inocente Gutierrez is a 41 year old female who presents to clinic today for the following health issues:        Bumps      Duration: years     Description  Location: right foot - starting to get painful and grow        Accompanying signs and symptoms: None    History (similar episodes/previous evaluation): None    Precipitating or alleviating factors:  Bought bigger shoes    Therapies tried and outcome: none         Was told bunion before and nothing to do but buy bigger shoes.  Did try a spacer that fit in between her toes and different shoes, nothing helps.   Ibuprofen does not help.   Some days feel worse but pain can be even without walking.   Even at rest it will hurt.   Numbness and tingling more on the plantar surface of her midfoot , off and on.   Does get callouses on tips of her toes.   Never had xray of this foot.   No trauma.               Problem list and histories reviewed & adjusted, as indicated.  Additional history: as documented    Patient Active Problem List   Diagnosis     Mild episode of recurrent major depressive disorder (H)     Pain in both wrists     Past Surgical History:   Procedure Laterality Date     APPENDECTOMY       LEEP TX, CERVICAL  2003    result unknown     TONSILLECTOMY         Social History   Substance Use Topics     Smoking status: Current Every Day Smoker     Packs/day: 1.00     Years: 20.00     Types: Cigarettes     Smokeless tobacco: Not on file      Comment: per pt smokes a pack every 3 days     Alcohol use Yes      Comment: OCC     Family History   Problem Relation Age of Onset     DIABETES Mother      Chronic Obstructive Pulmonary Disease Mother      Parkinsonism Mother      greene     Glaucoma No family hx of      Macular Degeneration No family hx of          Current Outpatient Prescriptions   Medication Sig Dispense Refill     albuterol (PROAIR HFA/PROVENTIL HFA/VENTOLIN HFA) 108 (90 BASE) MCG/ACT Inhaler  Inhale 2 puffs into the lungs Reported on 5/1/2017       FLUoxetine (PROZAC) 10 MG capsule Take 1 capsule (10 mg) by mouth daily Take 3 capsules once daily. 30 mg total. 90 capsule 5     ibuprofen (ADVIL/MOTRIN) 800 MG tablet Take 1 tablet (800 mg) by mouth every 8 hours as needed for moderate pain 90 tablet 11     No Known Allergies    Reviewed and updated as needed this visit by clinical staff       Reviewed and updated as needed this visit by Provider         ROS:  Constitutional, HEENT, cardiovascular, pulmonary, GI, , musculoskeletal, neuro, skin, endocrine and psych systems are negative, except as otherwise noted.    OBJECTIVE:                                                    /86  Pulse 88  Temp 98  F (36.7  C) (Oral)  Wt 125 lb (56.7 kg)  LMP 04/14/2017  SpO2 98%  BMI 25.25 kg/m2  Body mass index is 25.25 kg/(m^2).  GENERAL: healthy, alert and no distress  NECK: no adenopathy, no asymmetry, masses, or scars and thyroid normal to palpation  RESP: lungs clear to auscultation - no rales, rhonchi or wheezes  CV: regular rate and rhythm, normal S1 S2, no S3 or S4, no murmur, click or rub, no peripheral edema and peripheral pulses strong  MS: R foot-pulses intact and cap refill normal. Range of motion normal.  Right great toe-medium sized tender  bunion present.  Some callouses on other distal toes.  2nd toe is touching the great toe also from this. No erythema. R Achilles-marble sized fluctuant bump present. Not tender or warm.   SKIN: no suspicious lesions or rashes  NEURO: Normal strength and tone, mentation intact and speech normal  PSYCH: mentation appears normal, affect normal/bright    Xray:  FOOT RIGHT THREE OR MORE VIEWS 5/22/2017 4:45 PM      HISTORY: Pain in right foot     COMPARISON: None.         IMPRESSION: No acute fracture or dislocation. Bunion deformity of the  great toe.     ASSESSMENT/PLAN:                                                    ASSESSMENT / PLAN:  (M79.671) Right  foot pain  (primary encounter diagnosis)  Comment: bunion present. Not having pain in achilles but has ? Bursitis at that location.  Will refer.   Keep wearing spacious shoes. Avoid heels.     Plan: XR Foot Right G/E 3 Views, PODIATRY/FOOT &         ANKLE SURGERY REFERRAL                  Christa Rajan PA-C  [facility]

## 2017-05-22 NOTE — MR AVS SNAPSHOT
After Visit Summary   5/22/2017    Inocente Gutierrez    MRN: [id number]           Patient Information     Date Of Birth          1975        Visit Information        Provider Department      5/22/2017 3:40 PM Christa Rajan PA-C [facility]        Today's Diagnoses     Right foot pain    -  1       Follow-ups after your visit        Additional Services     PODIATRY/FOOT & ANKLE SURGERY REFERRAL       Your provider has referred you to: [facility]: [facility] [phone]   [email or web address]    Please be aware that coverage of these services is subject to the terms and limitations of your health insurance plan.  Call member services at your health plan with any benefit or coverage questions.      Please bring the following to your appointment:  >>   Any x-rays, CTs or MRIs which have been performed.  Contact the facility where they were done to arrange for  prior to your scheduled appointment.    >>   List of current medications   >>   This referral request   >>   Any documents/labs given to you for this referral                  Who to contact     If you have questions or need follow up information about today's clinic visit or your schedule please contact [facility] directly at [phone].  Normal or non-critical lab and imaging results will be communicated to you by MyChart, letter or phone within 4 business days after the clinic has received the results. If you do not hear from us within 7 days, please contact the clinic through [employer] or phone. If you have a critical or abnormal lab result, we will notify you by phone as soon as possible.  Submit refill requests through [employer] or call your pharmacy and they will forward the refill request to us. Please allow 3 business days for your refill to be completed.          Additional Information About Your Visit        Silver [employer]rvana Information     [employer] lets you send  "messages to your doctor, view your test results, renew your prescriptions, schedule appointments and more. To sign up, go to [email or web address] . Click on \"Log in\" on the left side of the screen, which will take you to the Welcome page. Then click on \"Sign up Now\" on the right side of the page.     You will be asked to enter the access code listed below, as well as some personal information. Please follow the directions to create your username and password.     Your access code is: [email or web address]  Expires: 8/10/2017 12:18 PM     Your access code will  in 90 days. If you need help or a new code, please call your [place] clinic or [phone].        Care EveryWhere ID     This is your Care EveryWhere ID. This could be used by other organizations to access your [place] medical records  [id number]        Your Vitals Were     Pulse Temperature Last Period Pulse Oximetry BMI (Body Mass Index)       88 98  F (36.7  C) (Oral) 2017 98% 25.25 kg/m2        Blood Pressure from Last 3 Encounters:   17 145/79   05/10/17 135/75   17 138/73    Weight from Last 3 Encounters:   17 125 lb (56.7 kg)   05/10/17 128 lb (58.1 kg)   17 128 lb 3.2 oz (58.2 kg)              We Performed the Following     PODIATRY/FOOT & ANKLE SURGERY REFERRAL     XR Foot Right G/E 3 Views        Primary Care Provider Office Phone # Fax #    [facility] [phone] [address] [facility]. [facility] [address]        Thank you!     Thank you for choosing [facility]  for your care. Our goal is always to provide you with excellent care. Hearing back from our patients is one way we can continue to improve our services. Please take a few minutes to complete the written survey that you may receive in the mail after your visit with us. Thank you!             Your Updated Medication List - Protect others around you: Learn how to safely use, store and throw away your medicines at " www.disposemymeds.org.          This list is accurate as of: 5/22/17  4:19 PM.  Always use your most recent med list.                   Brand Name Dispense Instructions for use    albuterol 108 (90 BASE) MCG/ACT Inhaler    PROAIR HFA/PROVENTIL HFA/VENTOLIN HFA     Inhale 2 puffs into the lungs Reported on 5/1/2017       FLUoxetine 10 MG capsule    PROZAC    90 capsule    Take 1 capsule (10 mg) by mouth daily Take 3 capsules once daily. 30 mg total.       ibuprofen 800 MG tablet    ADVIL/MOTRIN    90 tablet    Take 1 tablet (800 mg) by mouth every 8 hours as needed for moderate pain

## 2017-05-22 NOTE — TELEPHONE ENCOUNTER
Left message on answering machine for patient to call back team RN,  Nita MYERS, [phone]  Jennifer Calderon RN

## 2017-05-22 NOTE — TELEPHONE ENCOUNTER
Please call patient, okay to leave voicemail.  Results of xray are bunion noted, no fracture, and nothing in the area of her achilles bump which is likely bursitis.  Have her keep appt with elsy, nothing else needed at this time. Thankstavo

## 2017-05-22 NOTE — LETTER
[facility]  [address] [facility] [address] [phone]        May 23, 2017    Inocente Gutierrez  [address] [facility] [address]            Dear Inocente,    It was a pleasure to see you at your recent office visit.  Your test results are listed below.  Xray is as we discussed.         If you have any questions or concerns, please call the clinic at [phone].    Sincerely,  Christa Rajan PA-C    Results for orders placed or performed in visit on 05/22/17   XR Foot Right G/E 3 Views    Narrative    FOOT RIGHT THREE OR MORE VIEWS  5/22/2017 4:45 PM      HISTORY: Pain in right foot    COMPARISON: None.      Impression    IMPRESSION: No acute fracture or dislocation. Bunion deformity of the  great toe.    JENNIFER ORR MD

## 2017-05-22 NOTE — NURSING NOTE
"Chief Complaint   Patient presents with     Mass     right foot - callous on side of foot - painful - and on back of foot       Initial /79  Pulse 88  Temp 98  F (36.7  C) (Oral)  Wt 125 lb (56.7 kg)  LMP 04/14/2017  SpO2 98%  BMI 25.25 kg/m2 Estimated body mass index is 25.25 kg/(m^2) as calculated from the following:    Height as of 5/10/17: 4' 11\" (1.499 m).    Weight as of this encounter: 125 lb (56.7 kg).  Medication Reconciliation: complete  Raquel Linares M.A.    "

## 2017-05-23 NOTE — TELEPHONE ENCOUNTER
Pt notified of provider message as written.  Pt verbalized good understanding.  Nita Mcgraw RN

## 2017-05-23 NOTE — PROGRESS NOTES
Dear Inocente,      It was a pleasure to see you at your recent office visit.  Your test results are listed below.  Xray is as we discussed.         If you have any questions or concerns, please call the clinic at [phone].    Sincerely,  Christa Rajan PA-C

## 2017-06-04 ENCOUNTER — OFFICE VISIT (OUTPATIENT)
Dept: URGENT CARE | Facility: URGENT CARE | Age: 42
End: 2017-06-04
Payer: COMMERCIAL

## 2017-06-04 ENCOUNTER — RADIANT APPOINTMENT (OUTPATIENT)
Dept: GENERAL RADIOLOGY | Facility: CLINIC | Age: 42
End: 2017-06-04
Attending: FAMILY MEDICINE
Payer: COMMERCIAL

## 2017-06-04 VITALS
OXYGEN SATURATION: 99 % | HEART RATE: 101 BPM | SYSTOLIC BLOOD PRESSURE: 148 MMHG | TEMPERATURE: 97.7 F | DIASTOLIC BLOOD PRESSURE: 96 MMHG

## 2017-06-04 DIAGNOSIS — R05.9 COUGH: Primary | ICD-10-CM

## 2017-06-04 DIAGNOSIS — Z72.0 TOBACCO ABUSE: ICD-10-CM

## 2017-06-04 DIAGNOSIS — J06.9 UPPER RESPIRATORY TRACT INFECTION, UNSPECIFIED TYPE: ICD-10-CM

## 2017-06-04 DIAGNOSIS — R05.9 COUGH: ICD-10-CM

## 2017-06-04 PROCEDURE — 71020 XR CHEST 2 VW: CPT

## 2017-06-04 PROCEDURE — 99213 OFFICE O/P EST LOW 20 MIN: CPT | Performed by: FAMILY MEDICINE

## 2017-06-04 RX ORDER — AZITHROMYCIN 250 MG/1
TABLET, FILM COATED ORAL
Qty: 6 TABLET | Refills: 1 | Status: SHIPPED | OUTPATIENT
Start: 2017-06-04 | End: 2018-05-08

## 2017-06-04 RX ORDER — PREDNISONE 10 MG/1
20 TABLET ORAL DAILY
Qty: 10 TABLET | Refills: 1 | Status: SHIPPED | OUTPATIENT
Start: 2017-06-04 | End: 2017-06-09

## 2017-06-04 NOTE — PATIENT INSTRUCTIONS
Take the antibiotics and steroids for 5days    Keep working for a few days after you finish; one refill available if needed    Work on complete smoking cessation    Stay well hydrated    Follow up as needed based on symptoms     If symptoms acutely worsen, be seen promptly

## 2017-06-04 NOTE — NURSING NOTE
"Chief Complaint   Patient presents with     Shortness of Breath     Congestion started today     Estimated body mass index is 25.25 kg/(m^2) as calculated from the following:    Height as of 5/10/17: 4' 11\" (1.499 m).    Weight as of 5/22/17: 125 lb (56.7 kg).  BP Readings from Last 1 Encounters:   06/04/17 (!) 148/96   ]  BP cuff size:  regular  Do you feel safe in your environment?  Yes  Does the patient need any medication refills today? No    C/O Shortness of breath, congestion, cough, no ear pain, no fever, Has Hx of Asthma.  Bilateral hand numbness.  Last Neb treatment was this morning. Hilda Sofia CMA      "

## 2017-06-04 NOTE — PROGRESS NOTES
Inocente Gutierrez is a 41 year old female who comes in today for shortness of breath, started today.    Felt okay yesterday.    Some allergy symptoms recently     This am cough    Yellow/ green phlegm    From nose and chest     Had a tooth infection  That has resolved    Just occasionally uses albuterol    Chest hurts    Had pneumonia about 20 years ago    Smokes some, not much     Physical Exam   Constitutional: She is oriented to person, place, and time and well-developed, well-nourished, and in no distress.   HENT:   Head: Normocephalic and atraumatic.   Right Ear: Tympanic membrane, external ear and ear canal normal.   Left Ear: Tympanic membrane, external ear and ear canal normal.   Nose: Nose normal.   Mouth/Throat: Oropharynx is clear and moist.   No sinus / submandib discomfort on palpation     Eyes: Conjunctivae are normal.   Neck: Neck supple.   Cardiovascular: Normal rate, regular rhythm and normal heart sounds.    Pulmonary/Chest: Effort normal. No respiratory distress. She has wheezes.   Some coarse breath sounds more on right side   Abdominal: Soft. There is no tenderness.   Lymphadenopathy:     She has no cervical adenopathy.   Neurological: She is alert and oriented to person, place, and time.     cxr ordered    ASSESSMENT / PLAN:  (R05) Cough  (primary encounter diagnosis)  Comment: no pneumonia present  Plan: XR Chest 2 Views             (J06.9) Upper respiratory tract infection, unspecified type  Comment: given severity of symptoms and her underlying smoking, reasonable to cover with prednisone and antibiotics   Plan: predniSONE (DELTASONE) 10 MG tablet,         azithromycin (ZITHROMAX) 250 MG tablet             Tobacco: strongly advised complete smoking cessation       I reviewed the patient's medications, allergies, medical history, family history, and social history.    Joe Franklin MD

## 2017-06-04 NOTE — MR AVS SNAPSHOT
"              After Visit Summary   6/4/2017    Inocente Gutierrez    MRN: [id number]           Patient Information     Date Of Birth          1975        Visit Information        Provider Department      6/4/2017 2:45 PM Joe Franklin MD [facility]        Today's Diagnoses     Cough    -  1    Upper respiratory tract infection, unspecified type          Care Instructions    Take the antibiotics and steroids for 5days    Keep working for a few days after you finish; one refill available if needed    Work on complete smoking cessation    Stay well hydrated    Follow up as needed based on symptoms     If symptoms acutely worsen, be seen promptly          Follow-ups after your visit        Your next 10 appointments already scheduled     Jun 07, 2017  9:00 AM CDT   New Visit with Natalio Seo DPM   [facility]    [address] [facility] [address] [phone]              Who to contact     If you have questions or need follow up information about today's clinic visit or your schedule please contact [facility] directly at [phone].  Normal or non-critical lab and imaging results will be communicated to you by MyChart, letter or phone within 4 business days after the clinic has received the results. If you do not hear from us within 7 days, please contact the clinic through [employer] or phone. If you have a critical or abnormal lab result, we will notify you by phone as soon as possible.  Submit refill requests through [employer] or call your pharmacy and they will forward the refill request to us. Please allow 3 business days for your refill to be completed.          Additional Information About Your Visit        K[employer] Information     [employer] lets you send messages to your doctor, view your test results, renew your prescriptions, schedule appointments and more. To sign up, go to [email or web address] . Click on \"Log in\" on the " "left side of the screen, which will take you to the Welcome page. Then click on \"Sign up Now\" on the right side of the page.     You will be asked to enter the access code listed below, as well as some personal information. Please follow the directions to create your username and password.     Your access code is: [email or web address]  Expires: 8/10/2017 12:18 PM     Your access code will  in 90 days. If you need help or a new code, please call your [facility] or [phone].        Care EveryWhere ID     This is your Care EveryWhere ID. This could be used by other organizations to access your [place] medical records  [id number]        Your Vitals Were     Pulse Temperature Pulse Oximetry Breastfeeding?          101 97.7  F (36.5  C) (Oral) 99% No         Blood Pressure from Last 3 Encounters:   17 (!) 148/96   17 138/86   05/10/17 135/75    Weight from Last 3 Encounters:   17 125 lb (56.7 kg)   05/10/17 128 lb (58.1 kg)   17 128 lb 3.2 oz (58.2 kg)                 Today's Medication Changes          These changes are accurate as of: 17  3:32 PM.  If you have any questions, ask your nurse or doctor.               Start taking these medicines.        Dose/Directions    azithromycin 250 MG tablet   Commonly known as:  ZITHROMAX   Used for:  Upper respiratory tract infection, unspecified type   Started by:  Joe Franklin MD        2 po daily x one day then 1 po daily x 4 days   Quantity:  6 tablet   Refills:  1       predniSONE 10 MG tablet   Commonly known as:  DELTASONE   Used for:  Upper respiratory tract infection, unspecified type   Started by:  Joe Franklin MD        Dose:  20 mg   Take 2 tablets (20 mg) by mouth daily for 5 days   Quantity:  10 tablet   Refills:  1            Where to get your medicines      These medications were sent to [facility] [address] [place] -  [facility] [place]  [address] [facility] [address]     Phone:  [phone]     " azithromycin 250 MG tablet    predniSONE 10 MG tablet                Primary Care Provider Office Phone # Fax #    [facility] [phone] [address] [facility] [address]        Thank you!     Thank you for choosing [facility]  for your care. Our goal is always to provide you with excellent care. Hearing back from our patients is one way we can continue to improve our services. Please take a few minutes to complete the written survey that you may receive in the mail after your visit with us. Thank you!             Your Updated Medication List - Protect others around you: Learn how to safely use, store and throw away your medicines at www.disposemymeds.org.          This list is accurate as of: 6/4/17  3:32 PM.  Always use your most recent med list.                   Brand Name Dispense Instructions for use    albuterol 108 (90 BASE) MCG/ACT Inhaler    PROAIR HFA/PROVENTIL HFA/VENTOLIN HFA     Inhale 2 puffs into the lungs Reported on 5/1/2017       azithromycin 250 MG tablet    ZITHROMAX    6 tablet    2 po daily x one day then 1 po daily x 4 days       FLUoxetine 10 MG capsule    PROZAC    90 capsule    Take 1 capsule (10 mg) by mouth daily Take 3 capsules once daily. 30 mg total.       ibuprofen 800 MG tablet    ADVIL/MOTRIN    90 tablet    Take 1 tablet (800 mg) by mouth every 8 hours as needed for moderate pain       predniSONE 10 MG tablet    DELTASONE    10 tablet    Take 2 tablets (20 mg) by mouth daily for 5 days

## 2017-06-07 ENCOUNTER — OFFICE VISIT (OUTPATIENT)
Dept: PODIATRY | Facility: CLINIC | Age: 42
End: 2017-06-07
Payer: COMMERCIAL

## 2017-06-07 VITALS — BODY MASS INDEX: 25.25 KG/M2 | HEART RATE: 104 BPM | WEIGHT: 125 LBS | OXYGEN SATURATION: 100 %

## 2017-06-07 DIAGNOSIS — M21.619 BUNION: Primary | ICD-10-CM

## 2017-06-07 PROCEDURE — 99243 OFF/OP CNSLTJ NEW/EST LOW 30: CPT | Performed by: PODIATRIST

## 2017-06-07 NOTE — NURSING NOTE
"Chief Complaint   Patient presents with     Foot Pain     Bunion     right foot       Initial Pulse 104  Wt 56.7 kg (125 lb)  SpO2 100%  BMI 25.25 kg/m2 Estimated body mass index is 25.25 kg/(m^2) as calculated from the following:    Height as of 5/10/17: 1.499 m (4' 11\").    Weight as of this encounter: 56.7 kg (125 lb).  Medication Reconciliation: complete  "

## 2017-06-07 NOTE — PATIENT INSTRUCTIONS
SMOKING CESSATION    What's in cigarette smoke? - Cigarette smoke contains over 4,000 chemicals. Nicotine is one of the main ingredients which is an insecticide/herbicide. It is poisonous to our nervous system, increases blood clotting risk, and decreases the body's defenses to fight off infection. Another chemical is Carbon Monoxide is an asphyxiating gas that permanently binds to blood cells and blocks the transport of oxygen. This leads to tissue death and decreases your metabolism. Tar is a chemical that coats your lungs and trachea which impairs new oxygen coming in and carbon dioxide getting out of your body.   How does smoking impact surgery? - Smoking is particularly hazardous with regards to surgery. Surgery puts stress on the body and a smoker's body is already under strain from these chemicals. Putting the two together, especially for an elective surgery, could be a recipe for disaster. Smoking before and after surgery increases your risk of heart problems, slow wound healing, delayed bone healing, blood clots, wound infection and anesthesia complications.   What are the benefits of quitting? - In 20 minutes your blood pressure will drop back down to normal. In 8 hours the carbon monoxide (a toxic gas) levels in your blood stream will drop by half, and oxygen levels will return to normal. In 48 hours your chance of having a heart attack will have decreased. All nicotine will have left your body. Your sense of taste and smell will return to a normal level. In 72 hours your bronchial tubes will relax, and your energy levels will increase. In 2 weeks your circulation will increase, and it will continue to improve for the next 10 weeks.    Recommendations for elective surgery - Ideally, patients should quit smoking 8 weeks before and at least 2 weeks after elective surgery in order to avoid complications. Simply cutting back on the amount of cigarettes smoked per day does not offer any benefit or decrease the  risk of poor wound healing, heart problems, and infection. Smokers should also start taking Vitamin C and B for two weeks before surgery and two weeks after surgery.    Ways to Stop Smokin. Nicotine patches, lozenges, or gum  2. Support Groups  3. Medications (see below)    List of Medications:  1. Varenicline Tartrate (CHANTIX)   2. Bupropion HCL (WELLBUTRIN, ZYBAN)   note: make sure Wellbutrin is for smoking cessation and not other issues   3. Nicotine Patch (NICODERM)   4. Nicotine Inhaler (NICOTROL)   5. Nicotine Gum Nicotine Polacrilex   6. Nicotine Lozenge: Nicotine Polacrilex (COMMIT)   * [facility] offers a smoking support group as well!  Please visit: [email or web address]  If you are interested in these, ask about getting a prescription or talk to your primary care doctor about what may be the best way for you to quit.   Weight management plan: Patient was referred to their PCP to discuss a diet and exercise plan.

## 2017-06-07 NOTE — PROGRESS NOTES
Subjective:    Pt is seen today in consult from Christa Rajan with the c/c of painful right foot.  Had for years but recently getting larger and more painful.  Points to medial head of first metatarsal.  Has pain w/ ambulation and shoewear and is relieved by rest and going barefoot.  Denies pain in the contralateral foot.  Describes as burning pain.  Has tried toe spreaders in the past.  she works construction.  She is a smoker and trying to quit.        ROS:  Denies weakness, numbness, edema and ecchymosis     No Known Allergies    Current Outpatient Prescriptions   Medication Sig Dispense Refill     predniSONE (DELTASONE) 10 MG tablet Take 2 tablets (20 mg) by mouth daily for 5 days 10 tablet 1     azithromycin (ZITHROMAX) 250 MG tablet 2 po daily x one day then 1 po daily x 4 days 6 tablet 1     albuterol (PROAIR HFA/PROVENTIL HFA/VENTOLIN HFA) 108 (90 BASE) MCG/ACT Inhaler Inhale 2 puffs into the lungs Reported on 5/1/2017       FLUoxetine (PROZAC) 10 MG capsule Take 1 capsule (10 mg) by mouth daily Take 3 capsules once daily. 30 mg total. 90 capsule 5     ibuprofen (ADVIL/MOTRIN) 800 MG tablet Take 1 tablet (800 mg) by mouth every 8 hours as needed for moderate pain 90 tablet 11       Patient Active Problem List   Diagnosis     Mild episode of recurrent major depressive disorder (H)     Pain in both wrists       Past Medical History:   Diagnosis Date     Depressive disorder      Hypertension      Uncomplicated asthma        Past Surgical History:   Procedure Laterality Date     APPENDECTOMY       LEEP TX, CERVICAL  2003    result unknown     TONSILLECTOMY         Family History   Problem Relation Age of Onset     DIABETES Mother      Chronic Obstructive Pulmonary Disease Mother      Parkinsonism Mother      greene     Glaucoma No family hx of      Macular Degeneration No family hx of        Social History   Substance Use Topics     Smoking status: Current Every Day Smoker     Packs/day: 1.00     Years: 20.00      Types: Cigarettes     Smokeless tobacco: Not on file      Comment: per pt smokes a pack every 3 days     Alcohol use Yes      Comment: OCC     Objective:    O:  Pulse 104  Wt 56.7 kg (125 lb)  SpO2 100%  BMI 25.25 kg/m2.  Alert and oriented X 3.  Patient good historian.   Pulses DP, PT 2/4 b/l.  CRT < 3 seconds X 10 digits.  No edema or varicosities noted.  Sensation to light touch intact b/l.  Reflexes 2/4 b/l.  Skin has normal texture and turgor b/l.  Normal arch with weightbearing.  MS 5/5 all compartments.    No equinus.   Bilateral bunion deformities noted with weightbearing with the right being worse than the left.  No pain with range of motion.  Positive  tracking with ROM.  No medial bursa or masses noted.  No pain on the sesamoids or dorsally.  X-ray three views foot, nonweightbearing,  shows IM angle incrreased.  Abducted hallux.   No other fractures/pathology noted.      Assessment:  Hallux abducto valgus deformity right    Plan:  Xray personally reviewed.  Explained to patient that a bunion is caused by a muscle imbalance. The big toe is pulled toward the smaller toes. The lump is created by a bone pushing outward.   Bunion pain is usually a combination of shoes rubbing on the skin, nerve irritation, compression between the toes, joint misalignment, arthritis, and altered gait.   Most bunion pain can be improved by wearing compatible shoes. People with bunions cannot choose footwear just because they like the style. Your bunion should determine what shoe should be worn. Wide shoes with non-irritating seams, soft leather, and a square toe box are most compatible. Shoes should fit well out of the box but may need to be professionally stretched and modified to accommodate the bump. Heels, dress shoes, and pointed toes will not provide comfort.   Shoe inserts or orthotics will often times help with the bunion pain, however, inserts make a shoe fit more challenging. Pads placed around the bunion may help.    Bunion surgery involves cutting and repositioning the bones surrounding the bunion. Pins and screws are used to hold the bones in place during the healing process. The goal of bunion surgery is to reduce the size of the bunion bump. Realignment of the toe and joint is attempted. Most first toes can not be forced back into a normal alignment even with surgery.  Patient encouraged to quit smoking before surgery for two months.  Discussed conservative treatments such as good shoes with wide forefoot and no heels.  continue toe .  Discussed orthotics.  Briefly discussed surgery.  Would probably be ok with head osteotomy, but would have to get x-rays first.  thinking of surgery this winter.  Will return 2 months before surgery with x-rays and discuss procedure.  Thank you for allowing me participate in the care of this patient.        Natalio Seo DPM, FACFAS

## 2017-06-07 NOTE — MR AVS SNAPSHOT
After Visit Summary   6/7/2017    Inocente Gutierrez    MRN: [id number]           Patient Information     Date Of Birth          1975        Visit Information        Provider Department      6/7/2017 9:00 AM Natalio Seo, [facility]        Today's Diagnoses     Bunion    -  1      Care Instructions    SMOKING CESSATION    What's in cigarette smoke? - Cigarette smoke contains over 4,000 chemicals. Nicotine is one of the main ingredients which is an insecticide/herbicide. It is poisonous to our nervous system, increases blood clotting risk, and decreases the body's defenses to fight off infection. Another chemical is Carbon Monoxide is an asphyxiating gas that permanently binds to blood cells and blocks the transport of oxygen. This leads to tissue death and decreases your metabolism. Tar is a chemical that coats your lungs and trachea which impairs new oxygen coming in and carbon dioxide getting out of your body.   How does smoking impact surgery? - Smoking is particularly hazardous with regards to surgery. Surgery puts stress on the body and a smoker's body is already under strain from these chemicals. Putting the two together, especially for an elective surgery, could be a recipe for disaster. Smoking before and after surgery increases your risk of heart problems, slow wound healing, delayed bone healing, blood clots, wound infection and anesthesia complications.   What are the benefits of quitting? - In 20 minutes your blood pressure will drop back down to normal. In 8 hours the carbon monoxide (a toxic gas) levels in your blood stream will drop by half, and oxygen levels will return to normal. In 48 hours your chance of having a heart attack will have decreased. All nicotine will have left your body. Your sense of taste and smell will return to a normal level. In 72 hours your bronchial tubes will relax, and your energy levels will increase. In 2 weeks your circulation will  increase, and it will continue to improve for the next 10 weeks.    Recommendations for elective surgery - Ideally, patients should quit smoking 8 weeks before and at least 2 weeks after elective surgery in order to avoid complications. Simply cutting back on the amount of cigarettes smoked per day does not offer any benefit or decrease the risk of poor wound healing, heart problems, and infection. Smokers should also start taking Vitamin C and B for two weeks before surgery and two weeks after surgery.    Ways to Stop Smokin. Nicotine patches, lozenges, or gum  2. Support Groups  3. Medications (see below)    List of Medications:  1. Varenicline Tartrate (CHANTIX)   2. Bupropion HCL (WELLBUTRIN, ZYBAN) - note: make sure Wellbutrin is for smoking cessation and not other issues   3. Nicotine Patch (NICODERM)   4. Nicotine Inhaler (NICOTROL)   5. Nicotine Gum Nicotine Polacrilex   6. Nicotine Lozenge: Nicotine Polacrilex (COMMIT)   * [facility] offers a smoking support group as well!  Please visit: [email or web address]  If you are interested in these, ask about getting a prescription or talk to your primary care doctor about what may be the best way for you to quit.   Weight management plan: Patient was referred to their PCP to discuss a diet and exercise plan.                      Follow-ups after your visit        Who to contact     If you have questions or need follow up information about today's clinic visit or your schedule please contact [facility] directly at [phone].  Normal or non-critical lab and imaging results will be communicated to you by MyChart, letter or phone within 4 business days after the clinic has received the results. If you do not hear from us within 7 days, please contact the clinic through BeSmarthart or phone. If you have a critical or abnormal lab result, we will notify you by phone as soon as possible.  Submit refill requests through dMetrics or call  your pharmacy and they will forward the refill request to us. Please allow 3 business days for your refill to be completed.          Additional Information About Your Visit        Dragonfly Systemshart Information     [place] gives you secure access to your electronic health record. If you see a primary care provider, you can also send messages to your care team and make appointments. If you have questions, please call your primary care clinic.  If you do not have a primary care provider, please call [phone] and they will assist you.        Care EveryWhere ID     This is your Care EveryWhere ID. This could be used by other organizations to access your [place] medical records  [id number]        Your Vitals Were     Pulse Pulse Oximetry BMI (Body Mass Index)             104 100% 25.25 kg/m2          Blood Pressure from Last 3 Encounters:   06/04/17 (!) 148/96   05/22/17 138/86   05/10/17 135/75    Weight from Last 3 Encounters:   06/07/17 56.7 kg (125 lb)   05/22/17 56.7 kg (125 lb)   05/10/17 58.1 kg (128 lb)              Today, you had the following     No orders found for display       Primary Care Provider Office Phone # Fax #    [facility] [phone] [address] [facility] [facility] [address]        Thank you!     Thank you for choosing [facility]  for your care. Our goal is always to provide you with excellent care. Hearing back from our patients is one way we can continue to improve our services. Please take a few minutes to complete the written survey that you may receive in the mail after your visit with us. Thank you!             Your Updated Medication List - Protect others around you: Learn how to safely use, store and throw away your medicines at www.disposemymeds.org.          This list is accurate as of: 6/7/17 12:35 PM.  Always use your most recent med list.                   Brand Name Dispense Instructions for use    albuterol 108 (90 BASE) MCG/ACT Inhaler     PROAIR HFA/PROVENTIL HFA/VENTOLIN HFA     Inhale 2 puffs into the lungs Reported on 5/1/2017       azithromycin 250 MG tablet    ZITHROMAX    6 tablet    2 po daily x one day then 1 po daily x 4 days       FLUoxetine 10 MG capsule    PROZAC    90 capsule    Take 1 capsule (10 mg) by mouth daily Take 3 capsules once daily. 30 mg total.       ibuprofen 800 MG tablet    ADVIL/MOTRIN    90 tablet    Take 1 tablet (800 mg) by mouth every 8 hours as needed for moderate pain       predniSONE 10 MG tablet    DELTASONE    10 tablet    Take 2 tablets (20 mg) by mouth daily for 5 days

## 2017-12-23 ENCOUNTER — OFFICE VISIT (OUTPATIENT)
Dept: ORTHOPEDICS | Facility: CLINIC | Age: 42
End: 2017-12-23
Payer: COMMERCIAL

## 2017-12-23 DIAGNOSIS — G56.03 CARPAL TUNNEL SYNDROME, BILATERAL: Primary | ICD-10-CM

## 2017-12-23 PROCEDURE — 76942 ECHO GUIDE FOR BIOPSY: CPT | Performed by: FAMILY MEDICINE

## 2017-12-23 PROCEDURE — 20526 THER INJECTION CARP TUNNEL: CPT | Mod: 50 | Performed by: FAMILY MEDICINE

## 2017-12-23 NOTE — MR AVS SNAPSHOT
After Visit Summary   12/23/2017    Inocente Gutierrez    MRN: [id number]           Patient Information     Date Of Birth          1975        Visit Information        Provider Department      12/23/2017 10:00 AM Maximilian Leos MD [facility]        Today's Diagnoses     Carpal tunnel syndrome, bilateral    -  1       Follow-ups after your visit        Who to contact     If you have questions or need follow up information about today's clinic visit or your schedule please contact [facility] directly at [phone].  Normal or non-critical lab and imaging results will be communicated to you by Imaging3hart, letter or phone within 4 business days after the clinic has received the results. If you do not hear from us within 7 days, please contact the clinic through Searchlest or phone. If you have a critical or abnormal lab result, we will notify you by phone as soon as possible.  Submit refill requests through [email or web address] or call your pharmacy and they will forward the refill request to us. Please allow 3 business days for your refill to be completed.          Additional Information About Your Visit        MyChart Information     [email or web address] gives you secure access to your electronic health record. If you see a primary care provider, you can also send messages to your care team and make appointments. If you have questions, please call your primary care clinic.  If you do not have a primary care provider, please call [phone] and they will assist you.        Care EveryWhere ID     This is your Care EveryWhere ID. This could be used by other organizations to access your [place] medical records  [id number]         Blood Pressure from Last 3 Encounters:   06/04/17 (!) 148/96   05/22/17 138/86   05/10/17 135/75    Weight from Last 3 Encounters:   06/07/17 125 lb (56.7 kg)   05/22/17 125 lb (56.7 kg)   05/10/17 128 lb (58.1 kg)              We Performed the Following      INJECTION THERAPEUTIC, CARPAL TUNNEL     METHYLPREDNISOLONE 40 MG INJ     US Guided Needle Placement          Today's Medication Changes          These changes are accurate as of: 12/23/17 11:59 PM.  If you have any questions, ask your nurse or doctor.               Start taking these medicines.        Dose/Directions    methylPREDNISolone acetate 40 MG/ML injection   Commonly known as:  DEPO-MEDROL   Used for:  Carpal tunnel syndrome, bilateral        Dose:  80 mg   2 mLs (80 mg) by INTRA-ARTICULAR route once for 1 dose   Quantity:  2 mL   Refills:  0            Where to get your medicines      Some of these will need a paper prescription and others can be bought over the counter.  Ask your nurse if you have questions.     You don't need a prescription for these medications     methylPREDNISolone acetate 40 MG/ML injection                Primary Care Provider Office Phone # Fax #    [facility] [phone] [address] [facility] [address]        Equal Access to Services     ALAINA BOSE : Edith Barclay, wabubba foley, qaybta kaalmada beatrice, juan rosas . So [facility] [phone].    ATENCIÓN: Si habla español, tiene a olmstead disposición servicios gratuitos de asistencia lingüística. Llame al [phone].    We comply with applicable federal civil rights laws and [place] laws. We do not discriminate on the basis of race, color, national origin, age, disability, sex, sexual orientation, or gender identity.            Thank you!     Thank you for choosing [facility]  for your care. Our goal is always to provide you with excellent care. Hearing back from our patients is one way we can continue to improve our services. Please take a few minutes to complete the written survey that you may receive in the mail after your visit with us. Thank you!             Your Updated Medication List - Protect others around you:  Learn how to safely use, store and throw away your medicines at www.disposemymeds.org.          This list is accurate as of: 12/23/17 11:59 PM.  Always use your most recent med list.                   Brand Name Dispense Instructions for use Diagnosis    albuterol 108 (90 BASE) MCG/ACT Inhaler    PROAIR HFA/PROVENTIL HFA/VENTOLIN HFA     Inhale 2 puffs into the lungs Reported on 5/1/2017        azithromycin 250 MG tablet    ZITHROMAX    6 tablet    2 po daily x one day then 1 po daily x 4 days    Upper respiratory tract infection, unspecified type       FLUoxetine 10 MG capsule    PROZAC    90 capsule    Take 1 capsule (10 mg) by mouth daily Take 3 capsules once daily. 30 mg total.    Mild episode of recurrent major depressive disorder (H)       ibuprofen 800 MG tablet    ADVIL/MOTRIN    90 tablet    Take 1 tablet (800 mg) by mouth every 8 hours as needed for moderate pain    Dysmenorrhea       methylPREDNISolone acetate 40 MG/ML injection    DEPO-MEDROL    2 mL    2 mLs (80 mg) by INTRA-ARTICULAR route once for 1 dose    Carpal tunnel syndrome, bilateral

## 2017-12-23 NOTE — LETTER
12/23/2017         RE: Inocente Gutierrez  [address] [address]        Dear Colleague,    Thank you for referring your patient, Inocente Gutierrez, to the [facility]. Please see a copy of my visit note below.    Bilateral Carpal Tunnel Corticosteroid Injection    Diagnoses (preoperative and postoperative):  bilateral carpal tunnel syndrome    Current Procedure (include preoperative):  Sonographically guided bilateral carpal tunnel corticosteroid injection    Current Indication (include preoperative):  Alleviation of pain    REFERRED BY: Dr. Christy    REASON FOR REFERRAL: Dr. Christy has requested a sonographically guided bilateral carpal tunnel corticosteroid injection to help with modulation of pain.  Sonographic guidance will be used to place the medication around, but not directly into the median nerve.    PATIENT EDUCATION: Ready to learn with no apparent learning barriers identified.  Learning preferences include listening. Explained diagnosis and treatment plan as well as treatment alternatives. Patient expressed understanding of the content.    Following denial of allergy and review of potential side effects and complications including but not necessarily limited to infection, bleeding, allergic reaction, post-injection flare, local tissue breakdown (including but not limited to potential for skin depigmentation and/or subcutaneous fat atrophy), systemic effects of corticosteroids, elevation of blood glucose, injury to soft tissue and/or nerves and seizure, patient indicated their understanding and agreed to proceed. Written and signed consent was obtained and is scanned into the chart.    PROCEDURE: Prior to the procedure, the RIGHT  wrist/hand was examined with a 12 MHz linear transducer to visualize the joint space and determine the approach for the procedure.    Procedure was carried out using sterile technique including Betadine, a sterile transducer cover, and sterile  transducer gel. A simple surgical tray was used.    PROCEDURAL PAUSE: Procedural pause conducted to verify correct patient identity, procedure to be performed, and as applicable, correct side/site, correct patient  position, availability of implants, special equipment, or special requirements.     Patient position: Seated    Transducer type: 12 MHz linear array transducer    Approach: out of plane, proximal to distal on the volar surface of the wrist    Local Anesthesia: None    Aspirate: None    Injectate: Sonographically guided 25-gauge 1 inch needle was directly visualized entering above/below the median nerve.  After confirming needle tip position a solution containing 1 ml of 40 mg/ml Depo Medrol and 1 ml of 1% Lidocaine was injected and seen flowing around the median nerve.    Following this, the same procedure was then repeated on the LEFT wrist, also without complication.     AFTERCARE:  Patient tolerated the procedure without complication. After a short observation period, patient was discharged under their own power and in excellent condition.    Maximilian Leos MD      Again, thank you for allowing me to participate in the care of your patient.        Sincerely,        Maximilian Leos MD

## 2017-12-23 NOTE — PROGRESS NOTES
Bilateral Carpal Tunnel Corticosteroid Injection    Diagnoses (preoperative and postoperative):  bilateral carpal tunnel syndrome    Current Procedure (include preoperative):  Sonographically guided bilateral carpal tunnel corticosteroid injection    Current Indication (include preoperative):  Alleviation of pain    REFERRED BY: Dr. Christy    REASON FOR REFERRAL: Dr. Christy has requested a sonographically guided bilateral carpal tunnel corticosteroid injection to help with modulation of pain.  Sonographic guidance will be used to place the medication around, but not directly into the median nerve.    PATIENT EDUCATION: Ready to learn with no apparent learning barriers identified.  Learning preferences include listening. Explained diagnosis and treatment plan as well as treatment alternatives. Patient expressed understanding of the content.    Following denial of allergy and review of potential side effects and complications including but not necessarily limited to infection, bleeding, allergic reaction, post-injection flare, local tissue breakdown (including but not limited to potential for skin depigmentation and/or subcutaneous fat atrophy), systemic effects of corticosteroids, elevation of blood glucose, injury to soft tissue and/or nerves and seizure, patient indicated their understanding and agreed to proceed. Written and signed consent was obtained and is scanned into the chart.    PROCEDURE: Prior to the procedure, the RIGHT  wrist/hand was examined with a 12 MHz linear transducer to visualize the joint space and determine the approach for the procedure.    Procedure was carried out using sterile technique including Betadine, a sterile transducer cover, and sterile transducer gel. A simple surgical tray was used.    PROCEDURAL PAUSE: Procedural pause conducted to verify correct patient identity, procedure to be performed, and as applicable, correct side/site, correct patient  position, availability of  implants, special equipment, or special requirements.     Patient position: Seated    Transducer type: 12 MHz linear array transducer    Approach: out of plane, proximal to distal on the volar surface of the wrist    Local Anesthesia: None    Aspirate: None    Injectate: Sonographically guided 25-gauge 1 inch needle was directly visualized entering above/below the median nerve.  After confirming needle tip position a solution containing 1 ml of 40 mg/ml Depo Medrol and 1 ml of 1% Lidocaine was injected and seen flowing around the median nerve.    Following this, the same procedure was then repeated on the LEFT wrist, also without complication.     AFTERCARE:  Patient tolerated the procedure without complication. After a short observation period, patient was discharged under their own power and in excellent condition.    Maximilian Leos MD

## 2017-12-24 RX ORDER — METHYLPREDNISOLONE ACETATE 40 MG/ML
80 INJECTION, SUSPENSION INTRA-ARTICULAR; INTRALESIONAL; INTRAMUSCULAR; SOFT TISSUE ONCE
Qty: 2 ML | Refills: 0 | OUTPATIENT
Start: 2017-12-24 | End: 2017-12-24

## 2018-04-25 ENCOUNTER — TELEPHONE (OUTPATIENT)
Dept: FAMILY MEDICINE | Facility: CLINIC | Age: 43
End: 2018-04-25

## 2018-04-25 DIAGNOSIS — F33.0 MILD EPISODE OF RECURRENT MAJOR DEPRESSIVE DISORDER (H): ICD-10-CM

## 2018-04-25 RX ORDER — FLUOXETINE 10 MG/1
CAPSULE ORAL
Qty: 90 CAPSULE | Refills: 0 | Status: CANCELLED | OUTPATIENT
Start: 2018-04-25

## 2018-04-25 NOTE — TELEPHONE ENCOUNTER
Patient is calling stating out of medication would niyah to request refill RX: FLUoxetine (PROZAC) 10 MG capsule. Thank you.

## 2018-04-26 NOTE — TELEPHONE ENCOUNTER
See refill request yesterday.  Pt needs appointment per provider.  Left message on answering machine for patient to call back to [phone].  Nita Mcgraw RN

## 2018-04-27 NOTE — TELEPHONE ENCOUNTER
Left message on answering machine for patient to call back.  [phone]  Tata BLANCHARDN, RN, CPN

## 2018-04-28 DIAGNOSIS — F33.0 MILD EPISODE OF RECURRENT MAJOR DEPRESSIVE DISORDER (H): ICD-10-CM

## 2018-04-28 RX ORDER — FLUOXETINE 10 MG/1
CAPSULE ORAL
Qty: 30 CAPSULE | Refills: 6 | Status: CANCELLED | OUTPATIENT
Start: 2018-04-28

## 2018-04-30 RX ORDER — FLUOXETINE 10 MG/1
10 CAPSULE ORAL DAILY
Qty: 27 CAPSULE | Refills: 0 | Status: SHIPPED | OUTPATIENT
Start: 2018-04-30 | End: 2018-05-08

## 2018-04-30 NOTE — TELEPHONE ENCOUNTER
Patient states she is out of her medication, unable to be seen during the day, needs appointment after 3:30  Instructed patient it has been a year since last seen by Christa Rajan PA-C, needs appointment  Appointment with Christa Rajan PA-C 5/8 at 5:40  Refill sent until appointment.    Tata BLANCHARDN, RN, CPN

## 2018-05-01 NOTE — PROGRESS NOTES
SUBJECTIVE:                                                    Inocente Gutierrez is a 42 year old female who presents to clinic today for the following health issues:    Depression Followup    Status since last visit: No change per pt    See PHQ-9 for current symptoms.  Other associated symptoms: panic attack and neck ache    Complicating factors:   Significant life event:  No   Current substance abuse:  None  Anxiety or Panic symptoms:  Yes    PHQ-9 4/25/2017 5/1/2017   Total Score 3 7   Q9: Suicide Ideation Not at all Not at all       PHQ-9  English  PHQ-9   Any Language  Suicide Assessment Five-step Evaluation and Treatment (SAFE-T)    Amount of exercise or physical activity: None    Problems taking medications regularly: No    Medication side effects: none    Diet: regular (no restrictions)    prozac has well controlled her symptoms, has seen therapy in the past when she had things going on in her life.  Has things going on at home now, patient feels safe, and does not divulge details further.  offerred referral to therapy here, she declined for now but will let me know at any time if she would like to meet with them.  She has been on 40 mg of prozac before and that did not work well, she prefers to stay on 30 mg.   Denies suicidal or homicidal thoughts.  Patient instructed to go to the emergency room or call 911 if these occur.    Denies suicidal or homicidal thoughts.  Patient instructed to go to the emergency room or call 911 if these occur.      Smokes but has decreased over time.   Uses albuterol PRN when she gets a cold or sometimes during allergy season.  Doesn't need much.       Uses avdil prn cramps, would like refill. No bloody stools.     Problem list and histories reviewed & adjusted, as indicated.  Additional history: as documented    Patient Active Problem List   Diagnosis     Mild episode of recurrent major depressive disorder (H)     Pain in both wrists     Past Surgical History:   Procedure  "Laterality Date     APPENDECTOMY       LEEP TX, CERVICAL  2003    result unknown     TONSILLECTOMY         Social History   Substance Use Topics     Smoking status: Current Every Day Smoker     Packs/day: 1.00     Years: 20.00     Types: Cigarettes     Smokeless tobacco: Never Used      Comment: per pt smokes a pack every 3 days     Alcohol use Yes      Comment: OCC     Family History   Problem Relation Age of Onset     DIABETES Mother      Chronic Obstructive Pulmonary Disease Mother      Parkinsonism Mother      greene     Glaucoma No family hx of      Macular Degeneration No family hx of          Current Outpatient Prescriptions   Medication Sig Dispense Refill     albuterol (PROAIR HFA/PROVENTIL HFA/VENTOLIN HFA) 108 (90 Base) MCG/ACT Inhaler Inhale 2 puffs into the lungs every 6 hours as needed for shortness of breath / dyspnea or wheezing Reported on 5/1/2017 1 Inhaler 11     FLUoxetine (PROZAC) 10 MG capsule Take 1 capsule (10 mg) by mouth daily 30 capsule 11     FLUoxetine (PROZAC) 20 MG capsule Take 1 capsule (20 mg) by mouth daily 30 capsule 11     ibuprofen (ADVIL/MOTRIN) 800 MG tablet Take 1 tablet (800 mg) by mouth every 8 hours as needed for moderate pain 90 tablet 11     [DISCONTINUED] albuterol (PROAIR HFA/PROVENTIL HFA/VENTOLIN HFA) 108 (90 BASE) MCG/ACT Inhaler Inhale 2 puffs into the lungs Reported on 5/1/2017       [DISCONTINUED] FLUoxetine (PROZAC) 10 MG capsule Take 1 capsule (10 mg) by mouth daily Take 3 capsules once daily. 30 mg total. 27 capsule 0     [DISCONTINUED] FLUoxetine (PROZAC) 20 MG capsule Take 20 mg by mouth daily       No Known Allergies    ROS:  Constitutional, HEENT, cardiovascular, pulmonary, GI, , musculoskeletal, neuro, skin, endocrine and psych systems are negative, except as otherwise noted.    OBJECTIVE:     /72  Pulse 88  Temp 98.1  F (36.7  C) (Oral)  Resp 14  Ht 4' 11\" (1.499 m)  Wt 123 lb (55.8 kg)  SpO2 99%  Breastfeeding? No  BMI 24.84 kg/m2  Body " mass index is 24.84 kg/(m^2).  GENERAL: healthy, alert and no distress  RESP: lungs clear to auscultation - no rales, rhonchi or wheezes  CV: regular rate and rhythm, normal S1 S2, no S3 or S4, no murmur, click or rub, no peripheral edema and peripheral pulses strong  MS: no gross musculoskeletal defects noted, no edema  NEURO: Normal strength and tone, mentation intact and speech normal  PSYCH: mentation appears normal, affect normal/bright    Diagnostic Test Results:  none     ASSESSMENT/PLAN:     ASSESSMENT / PLAN:  (F33.0) Mild episode of recurrent major depressive disorder (H)  (primary encounter diagnosis)  Comment:   Plan: FLUoxetine (PROZAC) 10 MG capsule, FLUoxetine         (PROZAC) 20 MG capsule          Recheck 1 year, sooner if needed      (N94.6) Dysmenorrhea  Comment:   Plan: ibuprofen (ADVIL/MOTRIN) 800 MG tablet            (R06.2) Wheezing  Comment:   Plan: albuterol (PROAIR HFA/PROVENTIL HFA/VENTOLIN         HFA) 108 (90 Base) MCG/ACT Inhaler                Christa Rajan PA-C  [facility]

## 2018-05-08 ENCOUNTER — OFFICE VISIT (OUTPATIENT)
Dept: FAMILY MEDICINE | Facility: CLINIC | Age: 43
End: 2018-05-08
Payer: COMMERCIAL

## 2018-05-08 VITALS
TEMPERATURE: 98.1 F | WEIGHT: 123 LBS | BODY MASS INDEX: 24.8 KG/M2 | RESPIRATION RATE: 14 BRPM | DIASTOLIC BLOOD PRESSURE: 72 MMHG | HEIGHT: 59 IN | OXYGEN SATURATION: 99 % | HEART RATE: 88 BPM | SYSTOLIC BLOOD PRESSURE: 138 MMHG

## 2018-05-08 DIAGNOSIS — N94.6 DYSMENORRHEA: ICD-10-CM

## 2018-05-08 DIAGNOSIS — R06.2 WHEEZING: ICD-10-CM

## 2018-05-08 DIAGNOSIS — F33.0 MILD EPISODE OF RECURRENT MAJOR DEPRESSIVE DISORDER (H): Primary | ICD-10-CM

## 2018-05-08 PROCEDURE — 99213 OFFICE O/P EST LOW 20 MIN: CPT | Performed by: PHYSICIAN ASSISTANT

## 2018-05-08 RX ORDER — FLUOXETINE 10 MG/1
10 CAPSULE ORAL DAILY
Qty: 30 CAPSULE | Refills: 11 | Status: SHIPPED | OUTPATIENT
Start: 2018-05-08 | End: 2020-02-04

## 2018-05-08 RX ORDER — IBUPROFEN 800 MG/1
800 TABLET, FILM COATED ORAL EVERY 8 HOURS PRN
Qty: 90 TABLET | Refills: 11 | Status: SHIPPED | OUTPATIENT
Start: 2018-05-08 | End: 2020-02-04

## 2018-05-08 RX ORDER — ALBUTEROL SULFATE 90 UG/1
2 AEROSOL, METERED RESPIRATORY (INHALATION) EVERY 6 HOURS PRN
Qty: 1 INHALER | Refills: 11 | Status: SHIPPED | OUTPATIENT
Start: 2018-05-08 | End: 2019-07-29

## 2018-05-08 ASSESSMENT — ANXIETY QUESTIONNAIRES
IF YOU CHECKED OFF ANY PROBLEMS ON THIS QUESTIONNAIRE, HOW DIFFICULT HAVE THESE PROBLEMS MADE IT FOR YOU TO DO YOUR WORK, TAKE CARE OF THINGS AT HOME, OR GET ALONG WITH OTHER PEOPLE: VERY DIFFICULT
5. BEING SO RESTLESS THAT IT IS HARD TO SIT STILL: NOT AT ALL
3. WORRYING TOO MUCH ABOUT DIFFERENT THINGS: SEVERAL DAYS
1. FEELING NERVOUS, ANXIOUS, OR ON EDGE: SEVERAL DAYS
GAD7 TOTAL SCORE: 6
6. BECOMING EASILY ANNOYED OR IRRITABLE: NEARLY EVERY DAY
2. NOT BEING ABLE TO STOP OR CONTROL WORRYING: SEVERAL DAYS
7. FEELING AFRAID AS IF SOMETHING AWFUL MIGHT HAPPEN: NOT AT ALL

## 2018-05-08 ASSESSMENT — PATIENT HEALTH QUESTIONNAIRE - PHQ9: 5. POOR APPETITE OR OVEREATING: NOT AT ALL

## 2018-05-08 NOTE — MR AVS SNAPSHOT
"              After Visit Summary   5/8/2018    Inocente Gutierrez    MRN: [id number]           Patient Information     Date Of Birth          1975        Visit Information        Provider Department      5/8/2018 5:40 PM Christa Rajan PA-C [facility]        Today's Diagnoses     Mild episode of recurrent major depressive disorder (H)    -  1    Dysmenorrhea        Wheezing           Follow-ups after your visit        Who to contact     If you have questions or need follow up information about today's clinic visit or your schedule please contact [facility] directly at [phone].  Normal or non-critical lab and imaging results will be communicated to you by Trufflshart, letter or phone within 4 business days after the clinic has received the results. If you do not hear from us within 7 days, please contact the clinic through [employer] or phone. If you have a critical or abnormal lab result, we will notify you by phone as soon as possible.  Submit refill requests through [employer] or call your pharmacy and they will forward the refill request to us. Please allow 3 business days for your refill to be completed.          Additional Information About Your Visit        MyChart Information     [employer] gives you secure access to your electronic health record. If you see a primary care provider, you can also send messages to your care team and make appointments. If you have questions, please call your primary care clinic.  If you do not have a primary care provider, please call [phone] and they will assist you.        Care EveryWhere ID     This is your Care EveryWhere ID. This could be used by other organizations to access your [place] medical records  [id number]        Your Vitals Were     Pulse Temperature Respirations Height Pulse Oximetry Breastfeeding?    88 98.1  F (36.7  C) (Oral) 14 4' 11\" (1.499 m) 99% No    BMI (Body Mass Index)                   24.84 kg/m2            " Blood Pressure from Last 3 Encounters:   05/08/18 138/72   06/04/17 (!) 148/96   05/22/17 138/86    Weight from Last 3 Encounters:   05/08/18 123 lb (55.8 kg)   06/07/17 125 lb (56.7 kg)   05/22/17 125 lb (56.7 kg)              Today, you had the following     No orders found for display         Today's Medication Changes          These changes are accurate as of 5/8/18  5:59 PM.  If you have any questions, ask your nurse or doctor.               These medicines have changed or have updated prescriptions.        Dose/Directions    albuterol 108 (90 Base) MCG/ACT Inhaler   Commonly known as:  PROAIR HFA/PROVENTIL HFA/VENTOLIN HFA   This may have changed:    - when to take this  - reasons to take this   Used for:  Wheezing   Changed by:  Christa Rajan PA-C        Dose:  2 puff   Inhale 2 puffs into the lungs every 6 hours as needed for shortness of breath / dyspnea or wheezing Reported on 5/1/2017   Quantity:  1 Inhaler   Refills:  11       * FLUoxetine 10 MG capsule   Commonly known as:  PROZAC   This may have changed:  additional instructions   Used for:  Mild episode of recurrent major depressive disorder (H)   Changed by:  Christa Rajan PA-C        Dose:  10 mg   Take 1 capsule (10 mg) by mouth daily   Quantity:  30 capsule   Refills:  11       * FLUoxetine 20 MG capsule   Commonly known as:  PROZAC   This may have changed:  Another medication with the same name was changed. Make sure you understand how and when to take each.   Used for:  Mild episode of recurrent major depressive disorder (H)   Changed by:  Christa Rajan PA-C        Dose:  20 mg   Take 1 capsule (20 mg) by mouth daily   Quantity:  30 capsule   Refills:  11       * Notice:  This list has 2 medication(s) that are the same as other medications prescribed for you. Read the directions carefully, and ask your doctor or other care provider to review them with you.         Where to get your medicines      These  medications were sent to [id number]  [place] [facility], [place] - [address]  [address], [place] [facility] [address]     Phone:  [phone]     albuterol 108 (90 Base) MCG/ACT Inhaler    FLUoxetine 10 MG capsule    FLUoxetine 20 MG capsule    ibuprofen 800 MG tablet                Primary Care Provider Office Phone # Fax #    [facility] [phone] [address] [address] [address] [address]        Equal Access to Services     ALAINA BOSE : Hadii aad ku hadasho Soomaali, waaxda luqadaha, qaybta kaalmada adeegyada, waxay idiin hayaan adeeg kharaeladio rosas . So [facility] [place] [phone].    ATENCIÓN: Si habla español, tiene a olmstead disposición servicios gratuitos de asistencia lingüística. [facility] [phone].    We comply with applicable federal civil rights laws and [place] laws. We do not discriminate on the basis of race, color, national origin, age, disability, sex, sexual orientation, or gender identity.            Thank you!     Thank you for choosing [facility]  for your care. Our goal is always to provide you with excellent care. Hearing back from our patients is one way we can continue to improve our services. Please take a few minutes to complete the written survey that you may receive in the mail after your visit with us. Thank you!             Your Updated Medication List - Protect others around you: Learn how to safely use, store and throw away your medicines at www.disposemymeds.org.          This list is accurate as of 5/8/18  5:59 PM.  Always use your most recent med list.                   Brand Name Dispense Instructions for use Diagnosis    albuterol 108 (90 Base) MCG/ACT Inhaler    PROAIR HFA/PROVENTIL HFA/VENTOLIN HFA    1 Inhaler    Inhale 2 puffs into the lungs every 6 hours as needed for shortness of breath / dyspnea or wheezing Reported on 5/1/2017    Wheezing       * FLUoxetine 10 MG capsule    PROZAC    30 capsule    Take 1 capsule (10  mg) by mouth daily    Mild episode of recurrent major depressive disorder (H)       * FLUoxetine 20 MG capsule    PROZAC    30 capsule    Take 1 capsule (20 mg) by mouth daily    Mild episode of recurrent major depressive disorder (H)       ibuprofen 800 MG tablet    ADVIL/MOTRIN    90 tablet    Take 1 tablet (800 mg) by mouth every 8 hours as needed for moderate pain    Dysmenorrhea       * Notice:  This list has 2 medication(s) that are the same as other medications prescribed for you. Read the directions carefully, and ask your doctor or other care provider to review them with you.

## 2018-05-08 NOTE — NURSING NOTE
"Chief Complaint   Patient presents with     Depression     Health Maintenance     HIV, DAP and PHQ9       Initial /75  Pulse 88  Temp 98.1  F (36.7  C) (Oral)  Resp 14  Ht 4' 11\" (1.499 m)  Wt 123 lb (55.8 kg)  SpO2 99%  Breastfeeding? No  BMI 24.84 kg/m2 Estimated body mass index is 24.84 kg/(m^2) as calculated from the following:    Height as of this encounter: 4' 11\" (1.499 m).    Weight as of this encounter: 123 lb (55.8 kg).  Medication Reconciliation: complete      Danna Clay MA    "

## 2018-05-09 ASSESSMENT — PATIENT HEALTH QUESTIONNAIRE - PHQ9: SUM OF ALL RESPONSES TO PHQ QUESTIONS 1-9: 7

## 2018-05-09 ASSESSMENT — ANXIETY QUESTIONNAIRES: GAD7 TOTAL SCORE: 6

## 2019-06-14 ENCOUNTER — OFFICE VISIT (OUTPATIENT)
Dept: URGENT CARE | Facility: URGENT CARE | Age: 44
End: 2019-06-14
Payer: COMMERCIAL

## 2019-06-14 VITALS
BODY MASS INDEX: 25.65 KG/M2 | SYSTOLIC BLOOD PRESSURE: 136 MMHG | TEMPERATURE: 97.9 F | OXYGEN SATURATION: 98 % | WEIGHT: 127 LBS | DIASTOLIC BLOOD PRESSURE: 87 MMHG | HEART RATE: 83 BPM

## 2019-06-14 DIAGNOSIS — J45.21 MILD INTERMITTENT ASTHMA WITH (ACUTE) EXACERBATION: ICD-10-CM

## 2019-06-14 DIAGNOSIS — M26.609 TMJ (TEMPOROMANDIBULAR JOINT SYNDROME): Primary | ICD-10-CM

## 2019-06-14 DIAGNOSIS — J01.90 ACUTE SINUSITIS WITH SYMPTOMS > 10 DAYS: ICD-10-CM

## 2019-06-14 PROCEDURE — 99214 OFFICE O/P EST MOD 30 MIN: CPT | Performed by: FAMILY MEDICINE

## 2019-06-14 RX ORDER — AMOXICILLIN 875 MG
875 TABLET ORAL 2 TIMES DAILY
Qty: 20 TABLET | Refills: 0 | Status: SHIPPED | OUTPATIENT
Start: 2019-06-14 | End: 2019-06-24

## 2019-06-14 RX ORDER — PREDNISONE 10 MG/1
TABLET ORAL
Qty: 18 TABLET | Refills: 0 | Status: SHIPPED | OUTPATIENT
Start: 2019-06-14 | End: 2020-02-04

## 2019-06-15 NOTE — PROGRESS NOTES
"Chief complaint: left ear pain.    Denies any possibility of pregnancy declined a pregnancy test    Asthma and depression stable    Patient has had a cough for 2 weeks - patient would use her inhaler and would be fine the rest of the day  Also in the morning she would need her inhaler.    2 days ago thought maybe had an irritation in her left ear or wet  But then started having occasional stabbing pain in the left ear   And now having more sensitivity in the left side of her head    Patient saw a small tick on her left side of her head behind her left earlobe 3 weeks ago   Not sure how long it was there    Today having some throat pain as well- very mild     Patient is a smoker  Patient also works at a construction area and today was really dionna - patient usually wears a respiratory     Sinus congestion/sinus pain Yes  Chest pain or exertional shortness of breath: NO  Exposure to pertussis or pertussis like symptoms: NO  Orthopnea, worsening edema, pnd: NO  Rash: NO  Tried OTC medications without relief  No hemoptysis   No calf pain or tenderness. No signs or symptoms of DVT.   Worsening symptoms hence patient came in to be seen     Problem list and histories reviewed & adjusted, as indicated.  Additional history: as documented    Problem list, Medication list, Allergies, and Medical/Social/Surgical histories reviewed in EPIC and updated as appropriate.    ROS:  Constitutional, HEENT, cardiovascular, pulmonary, gi and gu systems are negative, except as otherwise noted.    OBJECTIVE:                                                    /87   Pulse 83   Temp 97.9  F (36.6  C) (Tympanic)   Wt 57.6 kg (127 lb)   .9 cm (59\")   SpO2 98%   BMI 25.65 kg/m    Body mass index is 25.65 kg/m .  GENERAL: healthy, alert and no distress  EYES: Pink palpebral conjunctiva, anicteric sclera  ENT: midline nasal septum normal ear exam. Some nasal congestion  Positive left tmj tenderness  NECK: no adenopathy, no " asymmetry, masses, or scars and thyroid normal to palpation  RESP: symmetrical chest expansion, no retractions, increased expiratory phase wheezes heard at bilateral lungs occasional  CV: regular rate and rhythm, normal S1 S2, no S3 or S4, no murmur, click or rub, no peripheral edema and peripheral pulses strong  SKIN: no readily visible rashes noted  MS: no gross musculoskeletal defects noted  Psych some stress but no thoughts of harming self or others     Diagnostic Test Results:  none      ASSESSMENT/PLAN:                                                        ICD-10-CM    1. TMJ (temporomandibular joint syndrome) M26.609    2. Acute sinusitis with symptoms > 10 days J01.90 amoxicillin (AMOXIL) 875 MG tablet   3. Mild intermittent asthma with (acute) exacerbation J45.21 predniSONE (DELTASONE) 10 MG tablet       Prescribed with amoxicillin   Over the counter meds discussed for tmj  Denies any history of ulcers or kidney disease or any known contraindication to NSAIDs  Prescribed with prednisone taper for asthma exacerbation  Follow up with primary care provider recommended in 3 days  Advised that prolonged cough > 3 weeks recommend chest xray, offered today, declined.   Adverse reactions of medications discussed.  Over the counter medications discussed.   Aware to come back in if with worsening symptoms or if no relief despite treatment plan  Patient voiced understanding and had no further questions.     MD Krupa Fleming MD  [facility]

## 2019-07-29 DIAGNOSIS — R06.2 WHEEZING: ICD-10-CM

## 2019-07-29 DIAGNOSIS — F33.0 MILD EPISODE OF RECURRENT MAJOR DEPRESSIVE DISORDER (H): ICD-10-CM

## 2019-07-29 NOTE — LETTER
July 31, 2019    Inocente Gutierrez  [address] [facility] [place] [address]    Dear Inocente,       We recently received a refill request for ventolin and fluoxetin.  We have refilled this for a one time 30 day supply only because you are due for a:    Depression and wheezing office visit      Please call at your earliest convenience so that there will not be a delay with your future refills.          Thank you,   Your [facility] Team/  [phone]

## 2019-07-30 RX ORDER — ALBUTEROL SULFATE 90 UG/1
AEROSOL, METERED RESPIRATORY (INHALATION)
Qty: 1 INHALER | Refills: 0 | Status: SHIPPED | OUTPATIENT
Start: 2019-07-30 | End: 2020-02-04

## 2019-07-30 NOTE — TELEPHONE ENCOUNTER
Last office visit with LIOR Godoy 5/8/18.  Prescription [place] per [facility] [place]  APPT NEEDED FOR FURTHER REFILLS    Please help the pt schedule an appointment depression, wheezing.    Health Maintenance Due   Topic Date Due     ASTHMA ACTION PLAN  1975     ASTHMA CONTROL TEST  1975     HIV SCREENING  06/26/1990     PREVENTIVE CARE VISIT  05/01/2018     PHQ-9  11/08/2018     Consuelo Lozano RN

## 2019-11-09 ENCOUNTER — HEALTH MAINTENANCE LETTER (OUTPATIENT)
Age: 44
End: 2019-11-09

## 2019-11-25 DIAGNOSIS — R06.2 WHEEZING: ICD-10-CM

## 2019-11-25 NOTE — LETTER
November 29, 2019    Inocente Gutierrez  [address] [facility] [place] [address]    Dear Inocente,       We recently received a refill request for VENTOLIN  (90 Base) MCG/ACT inhaler .  We were unable to refill this because you are due for a:    Wheezing/medication check office visit      Please call at your earliest convenience so that there will not be a delay with your future refills.          Thank you,   Your [facility] Team/  [phone]

## 2019-11-26 RX ORDER — ALBUTEROL SULFATE 90 UG/1
AEROSOL, METERED RESPIRATORY (INHALATION)
Refills: 0 | OUTPATIENT
Start: 2019-11-26

## 2019-11-26 NOTE — TELEPHONE ENCOUNTER
Patient advise at last refill, appointment needed for further refills.  No pending appointment./  No showed last office visit 6/19/19  Last office visit 5/8/18.  Consuelo Lozano RN

## 2020-02-04 ENCOUNTER — OFFICE VISIT (OUTPATIENT)
Dept: URGENT CARE | Facility: URGENT CARE | Age: 45
End: 2020-02-04
Payer: COMMERCIAL

## 2020-02-04 VITALS
TEMPERATURE: 98.6 F | DIASTOLIC BLOOD PRESSURE: 79 MMHG | WEIGHT: 127 LBS | BODY MASS INDEX: 25.65 KG/M2 | HEART RATE: 89 BPM | SYSTOLIC BLOOD PRESSURE: 156 MMHG | OXYGEN SATURATION: 100 %

## 2020-02-04 DIAGNOSIS — J01.90 ACUTE NON-RECURRENT SINUSITIS, UNSPECIFIED LOCATION: ICD-10-CM

## 2020-02-04 DIAGNOSIS — H92.01 OTALGIA, RIGHT: Primary | ICD-10-CM

## 2020-02-04 PROCEDURE — 99214 OFFICE O/P EST MOD 30 MIN: CPT | Performed by: PHYSICIAN ASSISTANT

## 2020-02-04 RX ORDER — AMOXICILLIN 875 MG
875 TABLET ORAL 2 TIMES DAILY
Qty: 20 TABLET | Refills: 0 | Status: SHIPPED | OUTPATIENT
Start: 2020-02-04 | End: 2020-02-25

## 2020-02-04 RX ORDER — VALACYCLOVIR HYDROCHLORIDE 1 G/1
1000 TABLET, FILM COATED ORAL 3 TIMES DAILY
Qty: 21 TABLET | Refills: 0 | Status: SHIPPED | OUTPATIENT
Start: 2020-02-04 | End: 2020-02-25

## 2020-02-04 ASSESSMENT — ENCOUNTER SYMPTOMS
HEADACHES: 1
SINUS PRESSURE: 0
WHEEZING: 0
SINUS PAIN: 0
GASTROINTESTINAL NEGATIVE: 1
CARDIOVASCULAR NEGATIVE: 1
PALPITATIONS: 0
FATIGUE: 0
SORE THROAT: 0
CHILLS: 0
RESPIRATORY NEGATIVE: 1
CONSTITUTIONAL NEGATIVE: 1
COUGH: 0
FEVER: 0
RHINORRHEA: 1
SHORTNESS OF BREATH: 0

## 2020-02-05 NOTE — PROGRESS NOTES
Subjective   Inocente Gutierrez is a 44 year old female who presents to clinic today for the following health issues:  HPI   Ear pain    Duration: 2days    Description  nasal congestion, rhinorrhea, sinus congestion and R ear pain, HA.  No ear drainage, dizziness, n/v or changes in her hearing.  Feels like the whole R side of her head and scalp is tender with burning sensation.      Severity: moderate    Accompanying signs and symptoms: No cough, shortness of breath or wheezing.  No abdominal pain, n/v, constipation, diarrhea, bloody or black tarry stools.  No fever, chills or sweats.  No recent swimming.    History (predisposing factors):  No ill contacts.  No pmh of asthma.  Non-smoker.    Precipitating or alleviating factors: None    Therapies tried and outcome:  rest and fluids OTC NSAID with minimal relief    Patient Active Problem List   Diagnosis     Mild episode of recurrent major depressive disorder (H)     Pain in both wrists     Past Surgical History:   Procedure Laterality Date     APPENDECTOMY       LEEP TX, CERVICAL  2003    result unknown     TONSILLECTOMY         Social History     Tobacco Use     Smoking status: Current Every Day Smoker     Packs/day: 1.00     Years: 20.00     Pack years: 20.00     Types: Cigarettes     Smokeless tobacco: Never Used     Tobacco comment: per pt smokes a pack every 3 days   Substance Use Topics     Alcohol use: Yes     Comment: OCC     Family History   Problem Relation Age of Onset     Diabetes Mother      Chronic Obstructive Pulmonary Disease Mother      Parkinsonism Mother         greene     Glaucoma No family hx of      Macular Degeneration No family hx of          Current Outpatient Medications   Medication Sig Dispense Refill     FLUoxetine (PROZAC) 20 MG capsule TAKE 1 CAPSULE BY MOUTH ONCE DAILY 30 capsule 0     No Known Allergies    Reviewed and updated as needed this visit by Provider       Review of Systems   Constitutional: Negative.  Negative for chills,  fatigue and fever.   HENT: Positive for congestion, ear pain and rhinorrhea. Negative for ear discharge, hearing loss, sinus pressure, sinus pain and sore throat.    Respiratory: Negative.  Negative for cough, shortness of breath and wheezing.    Cardiovascular: Negative.  Negative for chest pain, palpitations and peripheral edema.   Gastrointestinal: Negative.    Neurological: Positive for headaches.   All other systems reviewed and are negative.           Objective    BP (!) 156/79   Pulse 89   Temp 98.6  F (37  C) (Oral)   Wt 57.6 kg (127 lb)   SpO2 100%   Breastfeeding No   BMI 25.65 kg/m    Body mass index is 25.65 kg/m .  Physical Exam  Vitals signs and nursing note reviewed.   Constitutional:       General: She is not in acute distress.     Appearance: Normal appearance. She is well-developed and normal weight. She is not ill-appearing.   HENT:      Head: Normocephalic and atraumatic.      Comments: TMs are intact without any erythema or bulging bilaterally.  Airway is patent.     Nose: Mucosal edema, congestion and rhinorrhea present. Rhinorrhea is purulent.      Right Turbinates: Swollen.      Left Turbinates: Not swollen.      Right Sinus: Maxillary sinus tenderness and frontal sinus tenderness present.      Left Sinus: No maxillary sinus tenderness or frontal sinus tenderness.      Mouth/Throat:      Mouth: Mucous membranes are moist.      Pharynx: Uvula midline. No pharyngeal swelling, oropharyngeal exudate or posterior oropharyngeal erythema.      Tonsils: No tonsillar exudate.   Eyes:      General: No scleral icterus.     Extraocular Movements: Extraocular movements intact.      Conjunctiva/sclera: Conjunctivae normal.      Pupils: Pupils are equal, round, and reactive to light.   Neck:      Musculoskeletal: Normal range of motion and neck supple.      Thyroid: No thyromegaly.   Cardiovascular:      Rate and Rhythm: Normal rate and regular rhythm.      Pulses: Normal pulses.      Heart sounds:  Normal heart sounds, S1 normal and S2 normal. No murmur. No friction rub. No gallop.    Pulmonary:      Effort: Pulmonary effort is normal. No accessory muscle usage, respiratory distress or retractions.      Breath sounds: Normal breath sounds and air entry. No stridor. No decreased breath sounds, wheezing, rhonchi or rales.   Lymphadenopathy:      Cervical: No cervical adenopathy.   Skin:     General: Skin is warm and dry.      Findings: No lesion or rash.      Nails: There is no clubbing.     Neurological:      Mental Status: She is alert and oriented to person, place, and time.   Psychiatric:         Mood and Affect: Mood normal.         Behavior: Behavior normal.         Thought Content: Thought content normal.         Judgment: Judgment normal.              Assessment & Plan   Otalgia, right:  No evidence of ear infection.  ?ETD vs shingles prodrome vs sinusitis.  Will treat with [id number] for sinusitis and valtrex X7days to cover for possible shingles.  No lesions present.  Educated patient on pathophysiology, course and complications of shingles.  Recommend tylenol/ibuprofen prn pain/fever and zyrtec/pseudofed for sinus congestion.   Rest, fluids, chicken soup.  Recheck in clinic if symptoms worsen or if symptoms do not improve.    -     valACYclovir (VALTREX) 1000 mg tablet; Take 1 tablet (1,000 mg) by mouth 3 times daily for 7 days    Acute non-recurrent sinusitis, unspecified location  -     amoxicillin (AMOXIL) 875 MG tablet; Take 1 tablet (875 mg) by mouth 2 times daily for 10 days           Kayleigh See OSMANY Rodriguez  [facility]

## 2020-02-25 ENCOUNTER — OFFICE VISIT (OUTPATIENT)
Dept: FAMILY MEDICINE | Facility: CLINIC | Age: 45
End: 2020-02-25
Payer: COMMERCIAL

## 2020-02-25 VITALS
DIASTOLIC BLOOD PRESSURE: 75 MMHG | BODY MASS INDEX: 25.4 KG/M2 | WEIGHT: 126 LBS | HEART RATE: 76 BPM | SYSTOLIC BLOOD PRESSURE: 170 MMHG | TEMPERATURE: 98.2 F | HEIGHT: 59 IN | OXYGEN SATURATION: 100 %

## 2020-02-25 DIAGNOSIS — F31.81 BIPOLAR 2 DISORDER (H): Primary | ICD-10-CM

## 2020-02-25 DIAGNOSIS — J45.20 MILD INTERMITTENT ASTHMA WITHOUT COMPLICATION: ICD-10-CM

## 2020-02-25 PROCEDURE — 99214 OFFICE O/P EST MOD 30 MIN: CPT | Performed by: FAMILY MEDICINE

## 2020-02-25 RX ORDER — FLUOXETINE 40 MG/1
40 CAPSULE ORAL DAILY
Qty: 90 CAPSULE | Refills: 1 | Status: SHIPPED | OUTPATIENT
Start: 2020-02-25 | End: 2021-05-26

## 2020-02-25 RX ORDER — LAMOTRIGINE 25 MG/1
TABLET ORAL
Qty: 120 TABLET | Refills: 1 | Status: SHIPPED | OUTPATIENT
Start: 2020-02-25 | End: 2021-06-16

## 2020-02-25 RX ORDER — ALBUTEROL SULFATE 0.83 MG/ML
2.5 SOLUTION RESPIRATORY (INHALATION) EVERY 6 HOURS PRN
Qty: 1 BOX | Refills: 3 | Status: SHIPPED | OUTPATIENT
Start: 2020-02-25 | End: 2020-11-24

## 2020-02-25 RX ORDER — ALBUTEROL SULFATE 90 UG/1
2 AEROSOL, METERED RESPIRATORY (INHALATION) EVERY 4 HOURS PRN
Qty: 1 INHALER | Refills: 3 | Status: SHIPPED | OUTPATIENT
Start: 2020-02-25 | End: 2023-11-28

## 2020-02-25 ASSESSMENT — ANXIETY QUESTIONNAIRES
1. FEELING NERVOUS, ANXIOUS, OR ON EDGE: MORE THAN HALF THE DAYS
IF YOU CHECKED OFF ANY PROBLEMS ON THIS QUESTIONNAIRE, HOW DIFFICULT HAVE THESE PROBLEMS MADE IT FOR YOU TO DO YOUR WORK, TAKE CARE OF THINGS AT HOME, OR GET ALONG WITH OTHER PEOPLE: VERY DIFFICULT
7. FEELING AFRAID AS IF SOMETHING AWFUL MIGHT HAPPEN: NOT AT ALL
5. BEING SO RESTLESS THAT IT IS HARD TO SIT STILL: NOT AT ALL
2. NOT BEING ABLE TO STOP OR CONTROL WORRYING: SEVERAL DAYS
GAD7 TOTAL SCORE: 7
6. BECOMING EASILY ANNOYED OR IRRITABLE: MORE THAN HALF THE DAYS
3. WORRYING TOO MUCH ABOUT DIFFERENT THINGS: SEVERAL DAYS

## 2020-02-25 ASSESSMENT — MIFFLIN-ST. JEOR: SCORE: 1123.19

## 2020-02-25 ASSESSMENT — PATIENT HEALTH QUESTIONNAIRE - PHQ9
5. POOR APPETITE OR OVEREATING: SEVERAL DAYS
SUM OF ALL RESPONSES TO PHQ QUESTIONS 1-9: 12

## 2020-02-26 ASSESSMENT — ASTHMA QUESTIONNAIRES: ACT_TOTALSCORE: 21

## 2020-02-26 ASSESSMENT — ANXIETY QUESTIONNAIRES: GAD7 TOTAL SCORE: 7

## 2020-02-26 NOTE — PATIENT INSTRUCTIONS
1. Restart Prozac at higher dose of 40 mg daily.    2. Start Lamictal as prescribed.    3. I have refilled your Albuterol inhaler and neb.    4. You should start to think about quitting smoking - we can prescribe something for you at future visits.    5. We can also recheck your blood pressure at future visits.    6. Come back in 1 month to see Christa Rajan in one month for follow-up.

## 2020-05-07 ENCOUNTER — DOCUMENTATION ONLY (OUTPATIENT)
Dept: LAB | Facility: CLINIC | Age: 45
End: 2020-05-07

## 2020-05-07 ENCOUNTER — OFFICE VISIT (OUTPATIENT)
Dept: URGENT CARE | Facility: URGENT CARE | Age: 45
End: 2020-05-07
Payer: COMMERCIAL

## 2020-05-07 VITALS
BODY MASS INDEX: 25.46 KG/M2 | TEMPERATURE: 98.1 F | DIASTOLIC BLOOD PRESSURE: 80 MMHG | WEIGHT: 125 LBS | HEART RATE: 87 BPM | SYSTOLIC BLOOD PRESSURE: 165 MMHG

## 2020-05-07 DIAGNOSIS — N39.41 URGENCY INCONTINENCE: Primary | ICD-10-CM

## 2020-05-07 DIAGNOSIS — Z82.49 FAMILY HISTORY OF WOLFF-PARKINSON-WHITE (WPW) SYNDROME: ICD-10-CM

## 2020-05-07 DIAGNOSIS — I10 HYPERTENSION, UNSPECIFIED TYPE: ICD-10-CM

## 2020-05-07 DIAGNOSIS — Z82.49: ICD-10-CM

## 2020-05-07 DIAGNOSIS — R00.2 PALPITATIONS: ICD-10-CM

## 2020-05-07 DIAGNOSIS — Z82.49: Primary | ICD-10-CM

## 2020-05-07 DIAGNOSIS — R94.31 NONSPECIFIC ABNORMAL ELECTROCARDIOGRAM (ECG) (EKG): ICD-10-CM

## 2020-05-07 DIAGNOSIS — Z82.49 FAMILY HISTORY OF ISCHEMIC HEART DISEASE: ICD-10-CM

## 2020-05-07 LAB
ALBUMIN UR-MCNC: NEGATIVE MG/DL
APPEARANCE UR: CLEAR
BILIRUB UR QL STRIP: NEGATIVE
COLOR UR AUTO: YELLOW
GLUCOSE UR STRIP-MCNC: NEGATIVE MG/DL
HGB UR QL STRIP: ABNORMAL
KETONES UR STRIP-MCNC: NEGATIVE MG/DL
LEUKOCYTE ESTERASE UR QL STRIP: NEGATIVE
MUCOUS THREADS #/AREA URNS LPF: PRESENT /LPF
NITRATE UR QL: NEGATIVE
NON-SQ EPI CELLS #/AREA URNS LPF: ABNORMAL /LPF
PH UR STRIP: 5.5 PH (ref 5–7)
RBC #/AREA URNS AUTO: ABNORMAL /HPF
SOURCE: ABNORMAL
SP GR UR STRIP: >1.03 (ref 1–1.03)
UROBILINOGEN UR STRIP-ACNC: 0.2 EU/DL (ref 0.2–1)
WBC #/AREA URNS AUTO: ABNORMAL /HPF

## 2020-05-07 PROCEDURE — 87086 URINE CULTURE/COLONY COUNT: CPT | Performed by: FAMILY MEDICINE

## 2020-05-07 PROCEDURE — 81001 URINALYSIS AUTO W/SCOPE: CPT | Performed by: NURSE PRACTITIONER

## 2020-05-07 PROCEDURE — 99214 OFFICE O/P EST MOD 30 MIN: CPT | Performed by: FAMILY MEDICINE

## 2020-05-07 PROCEDURE — 93000 ELECTROCARDIOGRAM COMPLETE: CPT | Performed by: FAMILY MEDICINE

## 2020-05-08 LAB
ALBUMIN SERPL-MCNC: 3.9 G/DL (ref 3.4–5)
ALP SERPL-CCNC: 58 U/L (ref 40–150)
ALT SERPL W P-5'-P-CCNC: 28 U/L (ref 0–50)
ANION GAP SERPL CALCULATED.3IONS-SCNC: 4 MMOL/L (ref 3–14)
AST SERPL W P-5'-P-CCNC: 18 U/L (ref 0–45)
BACTERIA SPEC CULT: NO GROWTH
BASOPHILS # BLD AUTO: 0.1 10E9/L (ref 0–0.2)
BASOPHILS NFR BLD AUTO: 1 %
BILIRUB SERPL-MCNC: 0.3 MG/DL (ref 0.2–1.3)
BUN SERPL-MCNC: 23 MG/DL (ref 7–30)
CALCIUM SERPL-MCNC: 8.8 MG/DL (ref 8.5–10.1)
CHLORIDE SERPL-SCNC: 106 MMOL/L (ref 94–109)
CO2 SERPL-SCNC: 30 MMOL/L (ref 20–32)
CREAT SERPL-MCNC: 0.74 MG/DL (ref 0.52–1.04)
DIFFERENTIAL METHOD BLD: ABNORMAL
EOSINOPHIL # BLD AUTO: 0.8 10E9/L (ref 0–0.7)
EOSINOPHIL NFR BLD AUTO: 11 %
ERYTHROCYTE [DISTWIDTH] IN BLOOD BY AUTOMATED COUNT: 13.1 % (ref 10–15)
GFR SERPL CREATININE-BSD FRML MDRD: >90 ML/MIN/{1.73_M2}
GLUCOSE SERPL-MCNC: 75 MG/DL (ref 70–99)
HCT VFR BLD AUTO: 38.5 % (ref 35–47)
HGB BLD-MCNC: 12.5 G/DL (ref 11.7–15.7)
LYMPHOCYTES # BLD AUTO: 2.8 10E9/L (ref 0.8–5.3)
LYMPHOCYTES NFR BLD AUTO: 40 %
MCH RBC QN AUTO: 29.7 PG (ref 26.5–33)
MCHC RBC AUTO-ENTMCNC: 32.5 G/DL (ref 31.5–36.5)
MCV RBC AUTO: 91 FL (ref 78–100)
MONOCYTES # BLD AUTO: 0.6 10E9/L (ref 0–1.3)
MONOCYTES NFR BLD AUTO: 9 %
NEUTROPHILS # BLD AUTO: 2.7 10E9/L (ref 1.6–8.3)
NEUTROPHILS NFR BLD AUTO: 39 %
PLATELET # BLD AUTO: 297 10E9/L (ref 150–450)
PLATELET # BLD EST: ABNORMAL 10*3/UL
POTASSIUM SERPL-SCNC: 4.1 MMOL/L (ref 3.4–5.3)
PROT SERPL-MCNC: 7.5 G/DL (ref 6.8–8.8)
RBC # BLD AUTO: 4.21 10E12/L (ref 3.8–5.2)
RBC MORPH BLD: NORMAL
SODIUM SERPL-SCNC: 140 MMOL/L (ref 133–144)
SPECIMEN SOURCE: NORMAL
TSH SERPL DL<=0.005 MIU/L-ACNC: 0.84 MU/L (ref 0.4–4)
WBC # BLD AUTO: 7 10E9/L (ref 4–11)

## 2020-05-08 NOTE — PROGRESS NOTES
Chief complaint: dysuria urgency     Patient now the past month has had increased urgency -  Sometimes couldn't make it to the bathroom  She states she's always had this problem  But the past month has gotten much worse and she's had a few accidents at work     No thoughts of harming self or others  Feels safe at home     BP elevated today but asymptomatic. No headache no blurring of vision no chest pain no shortness of breath no dizziness no unsteadiness no numbness no weakness    She has a blood pressure cuff at home and would range from 160-170 systolic  Yesterday was 180 systolic and she was having headaches but then she said it got better  Patient has been having palpitations for a long time   Mom has WPW    No Known Allergies    Past Medical History:   Diagnosis Date     Depressive disorder      Hypertension      Uncomplicated asthma        albuterol (PROAIR HFA/PROVENTIL HFA/VENTOLIN HFA) 108 (90 Base) MCG/ACT inhaler, Inhale 2 puffs into the lungs every 4 hours as needed for shortness of breath / dyspnea or wheezing  albuterol (PROVENTIL) (2.5 MG/3ML) 0.083% neb solution, Take 1 vial (2.5 mg) by nebulization every 6 hours as needed for shortness of breath / dyspnea or wheezing  FLUoxetine (PROZAC) 40 MG capsule, Take 1 capsule (40 mg) by mouth daily  lamoTRIgine (LAMICTAL) 25 MG tablet, Take one per day for 5 days, 2 per day for 5 days, 3 per day for 5 days, then 4 per day and continue.    No current facility-administered medications on file prior to visit.       Social History     Tobacco Use     Smoking status: Current Every Day Smoker     Packs/day: 1.00     Years: 20.00     Pack years: 20.00     Types: Cigarettes     Smokeless tobacco: Never Used     Tobacco comment: per pt smokes a pack every 3 days   Substance Use Topics     Alcohol use: Yes     Comment: OCC     Drug use: No       ROS:  review of systems negative except for noted above.   No thoughts of harming self or others.     OBJECTIVE:  BP (!)  165/80   Pulse 87   Temp 98.1  F (36.7  C) (Oral)   Wt 56.7 kg (125 lb)   Breastfeeding No   BMI 25.46 kg/m     General:   awake, alert, and cooperative.  NAD.   Head: Normocephalic, atraumatic.  Eyes: Conjunctiva clear  ENT: thyroid not enlarged   Heart: Regular rate and rhythm. No murmur.  Lungs: Chest is clear; no wheezes or rales.   Abdomen: soft non-tender. No flank pain   Neuro: Alert and oriented - normal speech.  MS: Using extremities freely  PSYCH:  Normal affect, normal speech  SKIN: no obvious rashes    EKG to my personal review Poor Rwave progression       ASSESSMENT:    ICD-10-CM    1. Urgency incontinence  N39.41 *UA reflex to Microscopic and Culture ([facility] (except [place] and [place])     Urine Microscopic     Urine Culture Aerobic Bacterial     UROLOGY ADULT REFERRAL   2. Hypertension, unspecified type  I10 EKG 12-lead complete w/read - Clinics   3. Palpitations  R00.2    4. Family history of Nabil-Parkinson-White (WPW) syndrome  Z82.49 CBC with platelets     Comprehensive metabolic panel     TSH with free T4 reflex       PLAN:   Urine is normal - suspect urge incontinence - referred to Urology    Elevated blood pressures and palpitations. Family history of WPW  Patient had elevated BP yesterday with symptoms of headache.  I recommended PCP follow up however she states she has no primary care provider  And also is resistant because she doesn't want to miss work.  She knew about elevated blood pressures for a couple of years and has never gone in.    Because of this I did an EKG and ordered labs- patient is advised to follow up with primary care provider as a virtual telephone or video visit with primary care provider - to discuss results and her hypertension, palpitation, wpw family history.  She is declining me to start blood pressure meds - however she is willing to discuss this with primary care provider  She is considering cardiology follow up given abnormal EKG  findings that are subtle - she is uncertain- I placed the referral but may discuss with her primary care provider first.     Alarm signs or symptoms discussed, if present recommend go to ER   Your blood pressure reading was elevated today.  Recommend that you have it re-checked either at home or at our clinic within a week  Avoid cold medicines that have pseudoephedrin in it, or Sudafed. You may take regular or plain Robitussin or Mucinex  If you are unsure, please ask the pharmacist    If your blood pressure top number (systolic) 180 and above, or the bottom number (diastolic) 120 and above, you need to be seen immediately.  If persistently elevated top number 140 and above, or the bottom number 90 and above, please schedule an appointment to see a provider in clinic within a week.  If you have very elevated blood pressures, accompanied by headache, chest pain, numbness, weakness, slurring of speech, confusion, difficulty walking, call 911 and go to the ER        Advised about symptoms which might herald more serious problems.        Krupa Booker MD

## 2020-05-08 NOTE — PROGRESS NOTES
Patient was seen on 5.7.2020 in urgent care. A cbc was ordered and due to the nrbc flag a manual diff and smear review was completed. Patient's eosinophils are slightly above normal by a few points, would you like a cbc with differential instead? Also, there will be a delay in results as the slide needs to be sent to [place] due to rbc morpholgy being slightly abnormal.     Please advise on wether you would like a cbc with diff. Results should be in, in the afternoon of 5.8.2020 if an answer is received by 1030am.    Norah Mujica on 5/7/2020 at 9:40 PM

## 2020-05-08 NOTE — PROGRESS NOTES
RN notified lab of provider message below approving requested order, and RN placed the order per provider note below.     Ivanna Alexandre CNP at 5/8/2020  9:30 AM   Status: Signed       Yes, please do cbc with diff.  Thanks! SIVAN Olea BSN, RN

## 2020-05-13 ENCOUNTER — TELEPHONE (OUTPATIENT)
Dept: UROLOGY | Facility: CLINIC | Age: 45
End: 2020-05-13

## 2020-05-13 NOTE — TELEPHONE ENCOUNTER
Reason for Call:  Other appointment    Detailed comments: Patient calling, she would like to be seen for Urgency incontinence [N39.41]. Please call back to advise.    Phone Number Patient can be reached at: Cell number on file:    Telephone Information:   Mobile [phone]       Best Time: any    Can we leave a detailed message on this number? YES    Call taken on 5/13/2020 at 4:34 PM by Chuck Clay

## 2020-05-14 NOTE — TELEPHONE ENCOUNTER
RECORDS RECEIVED FROM: Internal   DATE RECEIVED: 5-19   NOTES STATUS DETAILS   OFFICE NOTE from referring provider    N/A    OFFICE NOTE from other cardiologist    N/A    DISCHARGE SUMMARY from hospital    N/A    DISCHARGE REPORT from the ER   N/A    OPERATIVE REPORT    N/A    MEDICATION LIST   Internal    LABS     BMP   N/A    CBC   Internal 5-7-20   CMP   Internal 5-7-20   Lipids   N/A    TSH   Internal 5-7-20   DIAGNOSTIC PROCEDURES     EKG   Internal    Monitor Reports   N/A    IMAGING (DISC & REPORT)      Echo   N/A    Stress Tests   N/A    Cath   N/A    MRI/MRA   N/A    CT/CTA   N/A

## 2020-05-19 ENCOUNTER — VIRTUAL VISIT (OUTPATIENT)
Dept: CARDIOLOGY | Facility: CLINIC | Age: 45
End: 2020-05-19
Attending: FAMILY MEDICINE
Payer: COMMERCIAL

## 2020-05-19 ENCOUNTER — PRE VISIT (OUTPATIENT)
Dept: CARDIOLOGY | Facility: CLINIC | Age: 45
End: 2020-05-19

## 2020-05-19 DIAGNOSIS — Z82.49 FAMILY HISTORY OF WOLFF-PARKINSON-WHITE (WPW) SYNDROME: ICD-10-CM

## 2020-05-19 DIAGNOSIS — R00.2 PALPITATIONS: ICD-10-CM

## 2020-05-19 DIAGNOSIS — I10 HYPERTENSION, UNSPECIFIED TYPE: ICD-10-CM

## 2020-05-19 DIAGNOSIS — Z53.9 NO SHOW: Primary | ICD-10-CM

## 2020-05-19 DIAGNOSIS — R94.31 NONSPECIFIC ABNORMAL ELECTROCARDIOGRAM (ECG) (EKG): ICD-10-CM

## 2020-05-19 NOTE — PROGRESS NOTES
This patient was a no show for this scheduled appointment.  This patient was a no show for this scheduled appointment.

## 2020-05-20 ENCOUNTER — TELEPHONE (OUTPATIENT)
Dept: UROLOGY | Facility: CLINIC | Age: 45
End: 2020-05-20

## 2020-05-20 NOTE — TELEPHONE ENCOUNTER
Reason for Call:  Other appointment    Detailed comments: Patient would like to be seen for Urgency incontinence [N39.41], states she doesn't know what that means but she doesn't believe its correct to her symptoms. Please call back to advise.    Phone Number Patient can be reached at: Home number on file [phone] (home)    Best Time: any    Can we leave a detailed message on this number? YES    Call taken on 5/20/2020 at 3:57 PM by Chuck Clay

## 2020-05-21 NOTE — TELEPHONE ENCOUNTER
Left detailed message that appointment was scheduled for her next week with Dr. Thomas.   Patient to return call to clinic if does not work.   Jazmin Nieto RN

## 2020-05-27 ENCOUNTER — VIRTUAL VISIT (OUTPATIENT)
Dept: CARDIOLOGY | Facility: CLINIC | Age: 45
End: 2020-05-27
Attending: INTERNAL MEDICINE
Payer: COMMERCIAL

## 2020-05-27 ENCOUNTER — VIRTUAL VISIT (OUTPATIENT)
Dept: UROLOGY | Facility: CLINIC | Age: 45
End: 2020-05-27
Payer: COMMERCIAL

## 2020-05-27 DIAGNOSIS — N39.41 URGE INCONTINENCE: Primary | ICD-10-CM

## 2020-05-27 DIAGNOSIS — I15.8 OTHER SECONDARY HYPERTENSION: ICD-10-CM

## 2020-05-27 DIAGNOSIS — G47.10 HYPERSOMNOLENCE: Primary | ICD-10-CM

## 2020-05-27 PROCEDURE — 99203 OFFICE O/P NEW LOW 30 MIN: CPT | Mod: 95 | Performed by: INTERNAL MEDICINE

## 2020-05-27 PROCEDURE — 99202 OFFICE O/P NEW SF 15 MIN: CPT | Mod: 95 | Performed by: UROLOGY

## 2020-05-27 NOTE — PROGRESS NOTES
"Inocente Gutierrez is a 44 year old female who is being evaluated via a billable video visit.      The patient has been notified of following:     \"This video visit will be conducted via a call between you and your physician/provider. We have found that certain health care needs can be provided without the need for an in-person physical exam.  This service lets us provide the care you need with a video conversation.  If a prescription is necessary we can send it directly to your pharmacy.  If lab work is needed we can place an order for that and you can then stop by our lab to have the test done at a later time.    Video visits are billed at different rates depending on your insurance coverage.  Please reach out to your insurance provider with any questions.    If during the course of the call the physician/provider feels a video visit is not appropriate, you will not be charged for this service.\"    Patient has given verbal consent for Video visit? Yes    How would you like to obtain your AVS? [facility]    Patient would like the video invitation sent by:   [phone]  Will anyone else be joining your video visit?         Video-Visit Details    Type of service:  Video Visit    Originating Location (pt. Location): home    Distant Location (provider location):  [facility]     Platform used for Video Visit: Niecy Gutierrez is a 44 year old female seen in consultation for urge and incont. Consult from Clinic, [facility] Home.    (All via video)    Pt reports several yr hx progressive urge and urge incont, now wears 2 med pads per day, no pad at nite, leaks en route to the restroom first thing in the morning.    Also c/o occasional need to double-void as well as \"I can actually feel my bladder filling sometimes.\"    Hx reflux as a child, never had surgery. Seen by Dr Morales in 2014, CT with suggestion of renal scarring; no intervention done.    Pt has tried OTC Oxytrol without success; no other med " trials; no success w Kegel's.    Denies dysuria, gross hematuria, frequency, BM issues.    Hx 3 vag deliveries, no hyster.    Drinks 1 coffee, 1 energy drink, 3 [place] per day. Smoker for 24 years.     Also now undergoing eval for HTN; to see Cardiology today. Also occasional backache; felt it was due to UTI several wks ago but UC was benign.      Past Medical History:   Diagnosis Date     Depressive disorder      Hypertension      Uncomplicated asthma        Past Surgical History:   Procedure Laterality Date     APPENDECTOMY       LEEP TX, CERVICAL  2003    result unknown     TONSILLECTOMY         Social History     Socioeconomic History     Marital status:      Spouse name: Not on file     Number of children: Not on file     Years of education: Not on file     Highest education level: Not on file   Occupational History     Not on file   Social Needs     Financial resource strain: Not on file     Food insecurity     Worry: Not on file     Inability: Not on file     Transportation needs     Medical: Not on file     Non-medical: Not on file   Tobacco Use     Smoking status: Current Every Day Smoker     Packs/day: 1.00     Years: 20.00     Pack years: 20.00     Types: Cigarettes     Smokeless tobacco: Never Used     Tobacco comment: per pt smokes a pack every 3 days   Substance and Sexual Activity     Alcohol use: Yes     Comment: OCC     Drug use: No     Sexual activity: Yes     Partners: Male     Birth control/protection: Male Surgical   Lifestyle     Physical activity     Days per week: Not on file     Minutes per session: Not on file     Stress: Not on file   Relationships     Social connections     Talks on phone: Not on file     Gets together: Not on file     Attends [employer] service: Not on file     Active member of club or organization: Not on file     Attends meetings of clubs or organizations: Not on file     Relationship status: Not on file     Intimate partner violence     Fear of current or ex  partner: Not on file     Emotionally abused: Not on file     Physically abused: Not on file     Forced sexual activity: Not on file   Other Topics Concern     Parent/sibling w/ CABG, MI or angioplasty before 65F 55M? Not Asked   Social History Narrative     Not on file       Current Outpatient Medications   Medication Sig Dispense Refill     albuterol (PROAIR HFA/PROVENTIL HFA/VENTOLIN HFA) 108 (90 Base) MCG/ACT inhaler Inhale 2 puffs into the lungs every 4 hours as needed for shortness of breath / dyspnea or wheezing 1 Inhaler 3     albuterol (PROVENTIL) (2.5 MG/3ML) 0.083% neb solution Take 1 vial (2.5 mg) by nebulization every 6 hours as needed for shortness of breath / dyspnea or wheezing 1 Box 3     FLUoxetine (PROZAC) 40 MG capsule Take 1 capsule (40 mg) by mouth daily 90 capsule 1     lamoTRIgine (LAMICTAL) 25 MG tablet Take one per day for 5 days, 2 per day for 5 days, 3 per day for 5 days, then 4 per day and continue. 120 tablet 1       Physical Exam:    (deferred)      5/7/20: UC negative      IMP:  1. Urge and urge incont  2. Hx VUR as a child and renal scarring  3. Tobacco and energy drinks  4. HTN      PLAN:  1. Discussed situation with patient in detail.  2. Consider moderation/cessation of tobacco and energy drinks; pt expresses understanding  3. RTC for pelvic/cysto  4. 25 minutes spent with patient, 100% spent in counseling and coordination of care for urge incont

## 2020-05-27 NOTE — LETTER
2020      RE: Inocente Gutierrez  [address] [facility] [facility] [address]       Dear Colleague,    Thank you for the opportunity to participate in the care of your patient, Inocente Gutierrez, at the [facility] HEART [facility]. Please see a copy of my visit note below.    Inocente Gutierrez is a 44 year old female who is being evaluated via a billable video visit.          Service Date: 2020      May 27, 2020      Krupa Booker MD   [facility] [place]   [address] [place]  [address]       RE: Inoecnte Gutierrez   MRN: [id number]   : 1975      Dear Dr. Booker:      It was a pleasure participating in the care of your patient, Ms. Inocente Gutierrez.  As you know, she is a 44-year-old lady who I see today for hypertension and hypersomnolence.      Her past medical history is significant for the followin.  Asthma.   2.  Bipolar disorder/depression.   3.  Wrist pain.   4.  Dysuria.   5.  Tonsillectomy.      She denies having any significant history of cardiac disease.      In terms of her present symptom complex, apparently she has been keeping track of her blood pressures since the end of September and has noted that since the end of September her blood pressures went from the 120s to running in the 160-170 range systolic consistently.  She went to urgent care and Urology for some dysuria was noted to have hypertension and was referred here.  Apparently, an EKG was done as well and was reportedly abnormal.      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  She does get some headaches but denies any significant chest pain or shortness of breath.  She denies other PND, orthopnea, edema, palpitations, syncope or near-syncope.      REVIEW OF SYSTEMS:  Positive for significant hypersomnolence as she  "snores loudly, does not feel well rested and is tired all the time and falls asleep quite easily watching TV, reading a book, lying down and needs to nap during the day.      In terms of her cardiac risk factors, no history of diabetes.  She did have preeclampsia in the past.  She smokes a pack every 3 days, has smoked for over 20 years.  Denies alcohol use.  Her cholesterol is \"okay.\"  No significant family history of cardiac disease, but she does have a mother who had WPW with ablation.        In terms of her present medications, she is takin.  Albuterol.   2.  Fluoxetine.   3.  Lamotrigine.      PHYSICAL EXAMINATION:     PSYCHIATRIC:  She appeared alert and oriented.   GENERAL:  Generally, she is comfortable, well groomed.   HEENT:  Eyes do not appear grossly erythematous or have any discharge.   RESPIRATORY:  She is breathing comfortably without gross cough.      The remainder of the comprehensive physical exam was deferred secondary to the public health emergency of the COVID-19 pandemic and also secondary to video visit restrictions.      EKG 2020 revealed normal sinus rhythm.  Voltage is difficult to interpret.  Doubt significant Q-waves are present.  No delta wave present.      2020 Potassium 4.1.  GFR, TSH, hemoglobin normal.        IMPRESSION:      Inocente is a 44-year-old lady without a prior documented history of cardiac disease who presents with 2 active issues:      1.  Significant hypertension of unclear etiology.      Interestingly, she has been tracking her blood pressures for some time, and she noted that she used to run in the 120s last fall; however, since the end of 2019 her blood pressures have skyrocketed to the 160-170 range systolic on a consistent basis.  Workup for secondary causes would certainly be indicated.      2.  Hypersomnolence and loud snoring.      The patient is clearly hypersomnolent as she falls asleep easily after a full night's sleep.  She " cannot watch TV because she falls asleep so easily and has to take frequent naps because she is tired all the time.  Additionally, she snores loudly and her blood pressures are uncontrolled.  Urgent sleep study would certainly be indicated.        PLAN:     1.  Sleep study ASAP.     2.  Echocardiogram to check LV systolic function, valve function and PA pressures.     3.  Hopefully, if treatment is begun quickly her blood pressures will come down precipitously and longterm medical therapy will not be required.      However, if sleep apnea is not present and/or if her blood pressures do not come down, then pursuing further additional workup for secondary causes would certainly be indicated at that time.      Further updates to follow.      Once again, it was a pleasure participating in the care of your patient, Ms. Inocente Gutierrez.  Please feel free to contact me anytime if you have any questions regarding her care in the future.      Sincerely,                Juan Colmenares MD           D: 2020   T: 2020   MT: LJ      Name:     INOCENTE GUTIERREZ   MRN:      [id number]        Account:      [id number]   :      1975           Service Date: 2020      Document: [id number]        Please do not hesitate to contact me if you have any questions/concerns.     Sincerely,     Juan Colmenares MD

## 2020-05-27 NOTE — PATIENT INSTRUCTIONS
Patient Instructions:  It was a pleasure to see you in the cardiology clinic today.      If you have any questions, you can reach my nurse, Victorina MORALEZ LPN, at [phone].  Press Option #1 for the [facility], and then press Option #4 for nursing.    We are encouraging the use of [employer] to communicate with your HealthCare Provider    Medication Changes: None.    Recommendations:   - A referral for a sleep study has been placed.  They should contact you to schedule.  If you do not hear from them, please call [phone] to schedule.    Studies Ordered: An echocardiogram at your earliest convenience.    The results from today include: None.    Please follow up: With Dr. Colmenares based on results of testing.    Sincerely,    Juan Colmenares MD     If you have an urgent need after hours (8:00 am to 4:30 pm) please call [phone] and ask for the cardiology fellow on call.

## 2020-05-27 NOTE — PROGRESS NOTES
"Service Date: 2020      May 27, 2020      Krupa Booker MD   [facility]   [address] [place]  [address]       RE: Inocente Gutierrez   MRN: [id number]   : 1975      Dear Dr. Booker:      It was a pleasure participating in the care of your patient, Ms. Inocente Gutierrez.  As you know, she is a 44-year-old lady who I see today for hypertension and hypersomnolence.      Her past medical history is significant for the followin.  Asthma.   2.  Bipolar disorder/depression.   3.  Wrist pain.   4.  Dysuria.   5.  Tonsillectomy.      She denies having any significant history of cardiac disease.      In terms of her present symptom complex, apparently she has been keeping track of her blood pressures since the end of September and has noted that since the end of September her blood pressures went from the 120s to running in the 160-170 range systolic consistently.  She went to urgent care and Urology for some dysuria was noted to have hypertension and was referred here.  Apparently, an EKG was done as well and was reportedly abnormal.      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  She does get some headaches but denies any significant chest pain or shortness of breath.  She denies other PND, orthopnea, edema, palpitations, syncope or near-syncope.      REVIEW OF SYSTEMS:  Positive for significant hypersomnolence as she snores loudly, does not feel well rested and is tired all the time and falls asleep quite easily watching TV, reading a book, lying down and needs to nap during the day.      In terms of her cardiac risk factors, no history of diabetes.  She did have preeclampsia in the past.  She smokes a pack every 3 days, has smoked for over 20 years.  Denies alcohol use.  Her cholesterol is \"okay.\"  No significant family " history of cardiac disease, but she does have a mother who had WPW with ablation.        In terms of her present medications, she is takin.  Albuterol.   2.  Fluoxetine.   3.  Lamotrigine.      PHYSICAL EXAMINATION:     PSYCHIATRIC:  She appeared alert and oriented.   GENERAL:  Generally, she is comfortable, well groomed.   HEENT:  Eyes do not appear grossly erythematous or have any discharge.   RESPIRATORY:  She is breathing comfortably without gross cough.      The remainder of the comprehensive physical exam was deferred secondary to the public health emergency of the COVID-19 pandemic and also secondary to video visit restrictions.      EKG 2020 revealed normal sinus rhythm.  Voltage is difficult to interpret.  Doubt significant Q-waves are present.  No delta wave present.      2020 Potassium 4.1.  GFR, TSH, hemoglobin normal.        IMPRESSION:      Inocente is a 44-year-old lady without a prior documented history of cardiac disease who presents with 2 active issues:      1.  Significant hypertension of unclear etiology.      Interestingly, she has been tracking her blood pressures for some time, and she noted that she used to run in the 120s last fall; however, since the end of 2019 her blood pressures have skyrocketed to the 160-170 range systolic on a consistent basis.  Workup for secondary causes would certainly be indicated.      2.  Hypersomnolence and loud snoring.      The patient is clearly hypersomnolent as she falls asleep easily after a full night's sleep.  She cannot watch TV because she falls asleep so easily and has to take frequent naps because she is tired all the time.  Additionally, she snores loudly and her blood pressures are uncontrolled.  Urgent sleep study would certainly be indicated.        PLAN:     1.  Sleep study ASAP.     2.  Echocardiogram to check LV systolic function, valve function and PA pressures.     3.  Hopefully, if treatment is begun  quickly her blood pressures will come down precipitously and longterm medical therapy will not be required.      However, if sleep apnea is not present and/or if her blood pressures do not come down, then pursuing further additional workup for secondary causes would certainly be indicated at that time.      Further updates to follow.      Once again, it was a pleasure participating in the care of your patient, Ms. Inocente Gutierrez.  Please feel free to contact me anytime if you have any questions regarding her care in the future.      Sincerely,                Juan Colmenares MD                 D: 2020   T: 2020   MT: DAVID      Name:     INOCENTE GUTIERREZ   MRN:      [id number]        Account:      [id number]   :      1975           Service Date: 2020      Document: [id number]

## 2020-05-27 NOTE — PROGRESS NOTES
"Inocente Gutierrez is a 44 year old female who is being evaluated via a billable video visit.      The patient has been notified of following:     \"This video visit will be conducted via a call between you and your physician/provider. We have found that certain health care needs can be provided without the need for an in-person physical exam.  This service lets us provide the care you need with a video conversation.  If a prescription is necessary we can send it directly to your pharmacy.  If lab work is needed we can place an order for that and you can then stop by our lab to have the test done at a later time.    Video visits are billed at different rates depending on your insurance coverage.  Please reach out to your insurance provider with any questions.    If during the course of the call the physician/provider feels a video visit is not appropriate, you will not be charged for this service.\"    Patient has given verbal consent for Video visit? Yes    How would you like to obtain your AVS? Tracey    Patient would like the video invitation sent by: Text to cell phone: [phone]    Duration of video visit 25 min    See dictation #[id number]      "

## 2020-06-08 ENCOUNTER — TELEPHONE (OUTPATIENT)
Dept: SLEEP MEDICINE | Facility: CLINIC | Age: 45
End: 2020-06-08

## 2020-06-08 NOTE — TELEPHONE ENCOUNTER
Left a voice mail for patient to schedule a video visit/consult. Orders are in the patient's chart. Please schedule.    Cleo Laboy

## 2020-06-30 ENCOUNTER — TELEPHONE (OUTPATIENT)
Dept: UROLOGY | Facility: CLINIC | Age: 45
End: 2020-06-30

## 2020-06-30 NOTE — TELEPHONE ENCOUNTER
Left message for patient to call [phone] to schedule a cysto/pelvic with Dr. Thomas to complete evaluation (post covid video). 30 minute appointment.  Julissa Tello CMA

## 2020-07-07 NOTE — TELEPHONE ENCOUNTER
Left message for patient to call [phone] to schedule in clinic cysto/pelvic.  Will send my chart message with our contact info.  Julissa Tello, CMA

## 2020-10-07 ENCOUNTER — TELEPHONE (OUTPATIENT)
Dept: FAMILY MEDICINE | Facility: CLINIC | Age: 45
End: 2020-10-07

## 2020-10-07 NOTE — TELEPHONE ENCOUNTER
Reason for Call:  Other call back    Detailed comments: I called the patient to clarify what questions I might help with. She states since last Sunday she started bleeding heavily vaginally. It is not her menstrual time now. She has also has had some cramping with it. Please call her to discuss her concerns.    Phone Number Patient can be reached at: Cell number on file:    Telephone Information:   Mobile [phone]       Best Time: Anytime    Can we leave a detailed message on this number? YES- name and call back number please    Call taken on 10/7/2020 at 3:52 PM by Jenae M. Hanson    Caller informed that calls received after 3pm may not be returned same day.

## 2020-10-07 NOTE — TELEPHONE ENCOUNTER
Reason for Call:  Other call back    Detailed comments: patient is calling with questions, would not give details. Please call to discuss. Thank you.    Phone Number Patient can be reached at: Home number on file [phone] (home)    Best Time:     Can we leave a detailed message on this number? YES    Call taken on 10/7/2020 at 3:31 PM by Leigh Clay

## 2020-10-07 NOTE — TELEPHONE ENCOUNTER
Left message on answering machine for patient to call back to [phone].  Left message to come into urgent care for evaluation bobby.  Nita BLANCHARDN, RN

## 2020-10-08 NOTE — TELEPHONE ENCOUNTER
Left message on answering machine for patient to call back to [phone].  Nita Mcgraw BSN, RN

## 2020-10-08 NOTE — TELEPHONE ENCOUNTER
Pt states she had three days of heavy vaginal bleeding that was not when she expected to get her period.  She had some abdominal pain with it but no fever.  She says it abruptly stopped and currently she does not have any sx.  Pt states there is no way it could have been a pregnancy because she is 45.  Advised to monitor sx, perimenopause could have irregular periods.  If more unusual vaginal bleeding then schedule with obgyn to discuss.  If abdominal pain with or without bleeding then we would want to see her in clinic for evaluation.  Pt verbalizes understanding and has no further questions.  Nita Mcgraw BSN, RN

## 2020-11-23 DIAGNOSIS — J45.20 MILD INTERMITTENT ASTHMA WITHOUT COMPLICATION: ICD-10-CM

## 2020-11-24 NOTE — TELEPHONE ENCOUNTER
Routing refill request to provider for review/approval because:  ACT is overdue    ACT Total Scores 2/25/2020   ACT TOTAL SCORE (Goal Greater than or Equal to 20) 21   In the past 12 months, how many times did you visit the emergency room for your asthma without being admitted to the hospital? 0   In the past 12 months, how many times were you hospitalized overnight because of your asthma? 0     Julissa Hilton, LON, RN

## 2020-11-27 RX ORDER — ALBUTEROL SULFATE 0.83 MG/ML
2.5 SOLUTION RESPIRATORY (INHALATION) EVERY 6 HOURS PRN
Qty: 1 BOX | Refills: 0 | Status: SHIPPED | OUTPATIENT
Start: 2020-11-27 | End: 2023-11-28

## 2020-12-06 ENCOUNTER — HEALTH MAINTENANCE LETTER (OUTPATIENT)
Age: 45
End: 2020-12-06

## 2021-01-01 ENCOUNTER — E-VISIT (OUTPATIENT)
Dept: URGENT CARE | Facility: URGENT CARE | Age: 46
End: 2021-01-01
Payer: COMMERCIAL

## 2021-01-01 DIAGNOSIS — Z20.822 SUSPECTED COVID-19 VIRUS INFECTION: Primary | ICD-10-CM

## 2021-01-01 PROCEDURE — 99421 OL DIG E/M SVC 5-10 MIN: CPT | Performed by: PHYSICIAN ASSISTANT

## 2021-01-01 NOTE — PATIENT INSTRUCTIONS
Dear Inocetne Gutierrez,    Your symptoms show that you may have coronavirus (COVID-19). This illness can cause fever, cough and trouble breathing. Many people get a mild case and get better on their own. Some people can get very sick.    Will I be tested for COVID-19?  We would like to test you for Covid-19 virus. I have placed orders for this test.     To schedule: go to your [facility] home page and scroll down to the section that says  You have an appointment that needs to be scheduled  and click the large green button that says  Schedule Now  and follow the steps to find the next available openings.    If you are unable to complete these [facility] scheduling steps, please call [phone] to schedule your testing.     Return to work/school/ guidance:  Please let your workplace manager and staffing office know when your quarantine ends     We can t give you an exact date as it depends on the above. You can calculate this on your own or work with your manager/staffing office to calculate this. (For example if you were exposed on 10/4, you would have to quarantine for 14 full days. That would be through 10/18. You could return on 10/19.)      If you receive a positive COVID-19 test result, follow the guidance of the those who are giving you the results. Usually the return to work is 10 (or in some cases 20 days from symptom onset.) If you work at [facility], you must also be cleared by Employee Occupational Health and Safety to return to work.        If you receive a negative COVID-19 test result and did not have a high risk exposure to someone with a known positive COVID-19 test, you can return to work once you're free of fever for 24 hours without fever-reducing medication and your symptoms are improving or resolved.      If you receive a negative COVID-19 test and If you had a high risk exposure to someone who has tested positive for COVID-19 then you can return to work 14 days after your last contact  with the positive individual    Note: If you have ongoing exposure to the covid positive person, this quarantine period may be more than 14 days. (For example, if you are continued to be exposed to your child who tested positive and cannot isolate from them, then the quarantine of 7-14 days can't start until your child is no longer contagious. This is typically 10 days from onset of the child's symptoms. So the total duration may be 17-24 days in this case.)    Sign up for [employer].   We know it's scary to hear that you might have COVID-19. We want to track your symptoms to make sure you're okay over the next 2 weeks. Please look for an email from [employer]--this is a free, online program that we'll use to keep in touch. To sign up, follow the link in the email you will receive. Learn more at [email or web address]    How can I take care of myself?    Get lots of rest. Drink extra fluids (unless a doctor has told you not to)    Take Tylenol (acetaminophen) or ibuprofen for fever or pain. If you have liver or kidney problems, ask your family doctor if it's okay to take Tylenol o ibuprofen    If you have other health problems (like cancer, heart failure, an organ transplant or severe kidney disease): Call your specialty clinic if you don't feel better in the next 2 days.    Know when to call 911. Emergency warning signs include:  o Trouble breathing or shortness of breath  o Pain or pressure in the chest that doesn't go away  o Feeling confused like you haven't felt before, or not being able to wake up  o Bluish-colored lips or face    Where can I get more information?  [facility] - About COVID-19:   [email or web address]    CDC - What to Do If You're Sick:   www.cdc.gov/coronavirus/2019-ncov/about/steps-when-sick.html    Dear Inocente Gutierrez,    Your symptoms show that you may have coronavirus (COVID-19). This illness can cause fever, cough and trouble breathing. Many people get a  mild case and get better on their own. Some people can get very sick.    Will I be tested for COVID-19?  We would like to test you for Covid-19 virus. I have placed orders for this test.     For all employees or close contacts (except [employer] and [employer] - see below), go to your [facility] home page and scroll down to the section that says  You have an appointment that needs to be scheduled  and click the large green button that says  Schedule Now  and follow the steps to find the next available opening.     If you are unable to complete these steps or if you cannot find any available times, please call [phone] to schedule employee testing.     [facility] employees or close contacts, please call [phone].   [place] ([employer]) employees or close contacts call [phone].    Return to work/school/ guidance:  Please let your workplace manager and staffing office know when your quarantine ends     We can t give you an exact date as it depends on the above. You can calculate this on your own or work with your manager/staffing office to calculate this. (For example if you were exposed on 10/4, you would have to quarantine for 14 full days. That would be through 10/18. You could return on 10/19.)      If you receive a positive COVID-19 test result, follow the guidance of the those who are giving you the results. Usually the return to work is 10 (or in some cases 20 days from symptom onset.) If you work at [employer], you must also be cleared by Employee Occupational Health and Safety to return to work.        If you receive a negative COVID-19 test result and did not have a high risk exposure to someone with a known positive COVID-19 test, you can return to work once you're free of fever for 24 hours without fever-reducing medication and your symptoms are improving or resolved.      If you receive a negative COVID-19 test and If you had a high risk exposure to someone who has tested positive for  COVID-19 then you can return to work 14 days after your last contact with the positive individual    Note: If you have ongoing exposure to the covid positive person, this quarantine period may be more than 14 days. (For example, if you are continued to be exposed to your child who tested positive and cannot isolate from them, then the quarantine of 7-14 days can't start until your child is no longer contagious. This is typically 10 days from onset of the child's symptoms. So the total duration may be 17-24 days in this case.)    Sign up for [employer].   We know it's scary to hear that you might have COVID-19. We want to track your symptoms to make sure you're okay over the next 2 weeks. Please look for an email from [employer]--this is a free, online program that we'll use to keep in touch. To sign up, follow the link in the email you will receive. Learn more at [email or web address]    How can I take care of myself?    Get lots of rest. Drink extra fluids (unless a doctor has told you not to)    Take Tylenol (acetaminophen) or ibuprofen for fever or pain. If you have liver or kidney problems, ask your family doctor if it's okay to take Tylenol o ibuprofen    If you have other health problems (like cancer, heart failure, an organ transplant or severe kidney disease): Call your specialty clinic if you don't feel better in the next 2 days.    Know when to call 911. Emergency warning signs include:  o Trouble breathing or shortness of breath  o Pain or pressure in the chest that doesn't go away  o Feeling confused like you haven't felt before, or not being able to wake up  o Bluish-colored lips or face    Where can I get more information?   [facility] [place] - About COVID-19:   [email or web address]    CDC - What to Do If You're Sick:   www.cdc.gov/coronavirus/2019-ncov/about/steps-when-sick.html    January 1, 2021  RE:  Inocente Gutierrez                                                                                                                   [address] [facility] [address]      To whom it may concern:    I evaluated Inocente Gutierrez on January 1, 2021. Inocente Gutierrez should be excused from work/school.     They should let their workplace manager and staffing office know when their quarantine ends.    We can not give an exact date as it depends on the information below. They can calculate this on their own or work with their manager/staffing office to calculate this. (For example if they were exposed on 10/04, they would have to quarantine for 14 full days. That would be through 10/18. They could return on 10/19.)    Quarantine Guidelines:      If patient receives a positive COVID-19 test result, they should follow the guidance of those who are giving the results. Usually the return to work is 10 (or in some cases 20 days from symptom onset.) If they work at [employer], they must be cleared by Employee Occupational Health and Safety to return to work.        If patient receives a negative COVID-19 test result and did not have a high risk exposure to someone with a known positive COVID-19 test, they can return to work once they're free of fever for 24 hours without fever-reducing medication and their symptoms are improving or resolved.      If patient receives a negative COVID-19 test and if they had a high risk exposure to someone who has tested positive for COVID-19 then they can return to work 14 days after their last contact with the positive individual    Note: If there is ongoing exposure to the covid positive person, this quarantine period may be longer than 14 days. (For example, if they are continually exposed to their child, who tested positive and cannot isolate from them, then the quarantine of 7-14 days can't start until their child is no longer contagious. This is typically 10 days from onset to the child's symptoms. So the total duration may be 17-24 days in this case.)      Sincerely,  Ara Paulino PA-C

## 2021-01-02 DIAGNOSIS — Z20.822 SUSPECTED COVID-19 VIRUS INFECTION: ICD-10-CM

## 2021-02-20 ENCOUNTER — HEALTH MAINTENANCE LETTER (OUTPATIENT)
Age: 46
End: 2021-02-20

## 2021-04-29 ENCOUNTER — OFFICE VISIT (OUTPATIENT)
Dept: URGENT CARE | Facility: URGENT CARE | Age: 46
End: 2021-04-29
Payer: COMMERCIAL

## 2021-04-29 VITALS
SYSTOLIC BLOOD PRESSURE: 163 MMHG | TEMPERATURE: 98.1 F | HEART RATE: 94 BPM | OXYGEN SATURATION: 100 % | DIASTOLIC BLOOD PRESSURE: 85 MMHG

## 2021-04-29 DIAGNOSIS — Z82.49 FAMILY HISTORY OF WOLFF-PARKINSON-WHITE (WPW) SYNDROME: ICD-10-CM

## 2021-04-29 DIAGNOSIS — R03.0 ELEVATED BLOOD PRESSURE READING WITHOUT DIAGNOSIS OF HYPERTENSION: ICD-10-CM

## 2021-04-29 DIAGNOSIS — R07.9 CHEST PAIN, UNSPECIFIED TYPE: Primary | ICD-10-CM

## 2021-04-29 PROCEDURE — 93000 ELECTROCARDIOGRAM COMPLETE: CPT | Performed by: FAMILY MEDICINE

## 2021-04-29 PROCEDURE — 99215 OFFICE O/P EST HI 40 MIN: CPT | Performed by: FAMILY MEDICINE

## 2021-04-29 NOTE — PATIENT INSTRUCTIONS
2 days chest and back pain - entire torso  Persistent moderate to severe   Not musculoskeletal on history   Recommend ASAP ER evaluation - rule out acute cardiorespiratory process  Concern for AORTIC DISSECTION

## 2021-04-29 NOTE — PROGRESS NOTES
Chief complaint: torso hurting    Entire chest and back and torso hurting x 2 days  She does not think is from musculoskeletal  Worse with exertion relieved by rest: no  Worse with certain movements or position: no  Associated with food intake or symptoms of GERD: no  Worse with taking in a deep breath: no  Cough/colds or respiratory symptoms: no  Signs or symptoms of DVT no    Problem list, Medication list, Allergies, and Medical/Social/Surgical histories reviewed in [facility] and updated as appropriate.      ROS:  General: negative for fever  Resp: chest pain as above  CV: + as above  ABD: Negative for abd pain.  Neurologic:negative for headache  10 point review of systems negative except for noted above.    OBJECTIVE:  BP (!) 163/85   Pulse 94   Temp 98.1  F (36.7  C) (Tympanic)   SpO2 100%    General:   awake, alert, and cooperative.  NAD.   Head: Normocephalic, atraumatic.  Eyes: Conjunctiva clear,   ENT: normal exam  Heart: Regular rate and rhythm. No murmur.  Lungs: Chest is clear; no wheezes or rales.   Abdomen: soft nontender  Neuro: Alert and oriented - normal speech. No gross neurologic deficits  Psych: Appropriate mood and affect.   Musculoskeletal: no joint swelling. no chest wall tenderness reproducible of pain. No calf pain or tenderness. No signs or symptoms of DVT   Vascular: no cyanosis      EKG done in clinic, reviewed this myself official: poor Rwave progression       ASSESSMENT:    ICD-10-CM    1. Chest pain, unspecified type  R07.9 EKG 12-lead complete w/read - Clinics   2. Family history of Nabil-Parkinson-White (WPW) syndrome  Z82.49 EKG 12-lead complete w/read - Clinics   3. Elevated blood pressure reading without diagnosis of hypertension  R03.0          PLAN:    2 days chest and back pain - entire torso  Persistent moderate to severe   Not musculoskeletal on history per patient   Recommend ASAP ER evaluation - rule out acute cardiorespiratory process  Concern for AORTIC DISSECTION   She  declined or she states she will think about it  I discussed possibilities of acute cardiorespiratory event, moribidity and mortality. This was discussed in detail.   She states she knows the risks   She states she will think about it and left.     Krupa Booker MD

## 2021-05-26 ENCOUNTER — TELEPHONE (OUTPATIENT)
Dept: FAMILY MEDICINE | Facility: CLINIC | Age: 46
End: 2021-05-26

## 2021-05-26 DIAGNOSIS — F31.81 BIPOLAR 2 DISORDER (H): ICD-10-CM

## 2021-05-26 RX ORDER — FLUOXETINE 40 MG/1
40 CAPSULE ORAL DAILY
Qty: 90 CAPSULE | Refills: 0 | Status: SHIPPED | OUTPATIENT
Start: 2021-05-26 | End: 2021-08-24

## 2021-05-26 NOTE — TELEPHONE ENCOUNTER
Reason for Call:  Medication or medication refill:    Do you use a [facility]?  Name of the pharmacy and phone number for the current request:  [id number] - [place], [place] - [address]     Name of the medication requested: FLUoxetine (PROZAC) 40 MG capsule    Other request: patient is currently out and needs this sent to the pharmacy ASAP.  Thank you    Can we leave a detailed message on this number? YES    Phone number patient can be reached at: Cell number on file:    Telephone Information:   Mobile [phone]       Best Time: any    Call taken on 5/26/2021 at 12:38 PM by Olivia Hager

## 2021-06-15 NOTE — PROGRESS NOTES
"    Assessment & Plan     Excessive or frequent menstruation  We discussed the change in flow she experienced-from a regular flow to more just clotting. Plan ultrasound and check wet prep to rule out infection. Declined STI screening. Will follow up after ultrasound and discussed management. If she has any bleeding prior to when her ultrasound is done, will send me a message. Patient is given an opportunity to ask questions and have them answered.  - US Pelvic Complete with Transvaginal; Future  - Wet prep    RENAN Hampton CNP [facility]   Inocente is a 45 year old who presents for the following health issues     HPI     Vaginal bleeding with intercourse    Patient normally has regular menstrual cycles. Every so often, will have a little spotting between her cycles. Had intercourse on 5/31/21 and then later that day started to pass clots. No actually flow initially, then had minimal spotting with the clots-not enough she needed a pad. That lasted 3 days. No symptoms since. That was a normal time for her cycle to start. Cycle in April was normal. No fever, nausea, cramping, abnormal urinary or vaginal symptoms. Does have a history of urinary urgency, incontinence. Using vasectomy for contraception. No STI concerns and declines need for screening.    Review of Systems   Constitutional, HEENT, cardiovascular, pulmonary, gi and gu systems are negative, except as otherwise noted.      Objective    BP (!) 146/89 (BP Location: Right arm, Patient Position: Sitting, Cuff Size: Adult Regular)   Pulse 95   Temp 98.1  F (36.7  C) (Tympanic)   Ht 1.492 m (4' 10.75\")   Wt 60.3 kg (133 lb)   LMP 05/31/2021 (Exact Date)   SpO2 97%   BMI 27.09 kg/m    Body mass index is 27.09 kg/m .  Physical Exam   GENERAL: healthy, alert and no distress  ABDOMEN: soft, nontender, no hepatosplenomegaly, no masses and bowel sounds normal   (female): normal female external genitalia, normal " urethral meatus , vaginal mucosa pink, moist, well rugated, vaginal discharge - moderate and white and normal cervix, adnexae, and uterus without masses.  MS: no gross musculoskeletal defects noted, no edema  SKIN: no suspicious lesions or rashes  PSYCH: mentation appears normal, affect normal/bright

## 2021-06-16 ENCOUNTER — OFFICE VISIT (OUTPATIENT)
Dept: OBGYN | Facility: CLINIC | Age: 46
End: 2021-06-16
Payer: COMMERCIAL

## 2021-06-16 VITALS
OXYGEN SATURATION: 97 % | DIASTOLIC BLOOD PRESSURE: 89 MMHG | HEART RATE: 95 BPM | SYSTOLIC BLOOD PRESSURE: 146 MMHG | HEIGHT: 59 IN | TEMPERATURE: 98.1 F | WEIGHT: 133 LBS | BODY MASS INDEX: 26.81 KG/M2

## 2021-06-16 DIAGNOSIS — N92.0 EXCESSIVE OR FREQUENT MENSTRUATION: Primary | ICD-10-CM

## 2021-06-16 LAB
SPECIMEN SOURCE: NORMAL
WET PREP SPEC: NORMAL

## 2021-06-16 PROCEDURE — 87210 SMEAR WET MOUNT SALINE/INK: CPT | Performed by: NURSE PRACTITIONER

## 2021-06-16 PROCEDURE — 99203 OFFICE O/P NEW LOW 30 MIN: CPT | Performed by: NURSE PRACTITIONER

## 2021-06-16 ASSESSMENT — PAIN SCALES - GENERAL: PAINLEVEL: NO PAIN (0)

## 2021-06-16 ASSESSMENT — MIFFLIN-ST. JEOR: SCORE: 1149.94

## 2021-06-18 ENCOUNTER — ANCILLARY PROCEDURE (OUTPATIENT)
Dept: ULTRASOUND IMAGING | Facility: OTHER | Age: 46
End: 2021-06-18
Attending: NURSE PRACTITIONER
Payer: COMMERCIAL

## 2021-06-18 DIAGNOSIS — N92.0 EXCESSIVE OR FREQUENT MENSTRUATION: ICD-10-CM

## 2021-06-18 PROCEDURE — 76830 TRANSVAGINAL US NON-OB: CPT | Performed by: RADIOLOGY

## 2021-06-18 PROCEDURE — 76856 US EXAM PELVIC COMPLETE: CPT | Performed by: RADIOLOGY

## 2021-06-27 NOTE — PROGRESS NOTES
"  [facility]  Cardiovascular Clinic Note      Referring Provider: SONNY Garcia   Primary Care Provider: Moiz Peraza [place]     Patient Name: Inocente Gutierrez   MRN: [id number]       History of Present Illness:  Inocente Gutierrez is a 46 year old female with PMH notable for MDD, HTN, fam hx of WPW, and asthma who presents for clinic follow up.    The patient was last seen 5/2020 with Dr. Colmenares.  At that visit, her blood pressure was quite elevated - as such, they pursued a sleep study and echocardiogram. The echocardiogram was not completed. Sleep study was not completed. Since that point in time, the patient has been seen in urgent care 1 time for an episode of chest discomfort.  She was found to have normal sinus rhythm on her EKG, but there was concern for previous cardiovascular event.  It was suggested that she be transferred to the emergency department, but the patient declined and went home.    In regards to the patient's hypertension, she continues to have quite elevated blood pressures.  Her systolic blood pressure is typically between 150 and 180 when she intermittently checks at home.  Today in clinic, the patient denies recent chest pain.  She has mild exertional dyspnea at baseline which she attributes to her asthma.  She has noted some improvement to her shortness of breath since smoking cessation in January 2021.  The patient experiences lower extremity swelling on a daily basis-she notes that this is worst at the end of the day.  She also notes that her heart will sometimes \"get stuck\".  She seems to ascribe these to palpitations.  She notes that this happens for a period 3-4 beats, sometimes once per week.  Nothing really seems to bring these on.  She denies any lightheadedness or chest pain with these episodes.  The patient does experience dizziness as well on a somewhat regular basis.  She notes that this will sometimes happen when she is \"just standing\".  This " "happens for a period of seconds and then resolves spontaneously.  The patient notes that she has near daily headaches.  These are typically located behind her ears and she describes this as a \"squeezing\" sensation.  However, she notes that she will \"sometimes\" have pain \"right behind her eyes\".  She is unsure how often she has these other types of headaches.    Pertinent Cardiac Medications:  N/A      Past Medical History:  Pertinent past medical history as above.      Past Surgical History:  Past Surgical History:   Procedure Laterality Date     APPENDECTOMY       LEEP TX, CERVICAL  2003    result unknown     TONSILLECTOMY           Family History:  Family History   Problem Relation Age of Onset     Diabetes Mother      Chronic Obstructive Pulmonary Disease Mother      Parkinsonism Mother         greene     Glaucoma No family hx of      Macular Degeneration No family hx of          Current Medications:  Current Outpatient Medications   Medication Sig Dispense Refill     albuterol (PROAIR HFA/PROVENTIL HFA/VENTOLIN HFA) 108 (90 Base) MCG/ACT inhaler Inhale 2 puffs into the lungs every 4 hours as needed for shortness of breath / dyspnea or wheezing 1 Inhaler 3     albuterol (PROVENTIL) (2.5 MG/3ML) 0.083% neb solution Take 1 vial (2.5 mg) by nebulization every 6 hours as needed for shortness of breath / dyspnea or wheezing 1 Box 0     amLODIPine (NORVASC) 5 MG tablet Take 1 tablet (5 mg) by mouth daily 60 tablet 1     FLUoxetine (PROZAC) 40 MG capsule Take 1 capsule (40 mg) by mouth daily 90 capsule 0         Social History:  Works in construction    Physical Exam:  BP (!) 157/80 (BP Location: Left arm, Patient Position: Chair, Cuff Size: Adult Regular)   Pulse 79   Wt 63.5 kg (140 lb)   LMP 05/31/2021 (Exact Date)   SpO2 100%   BMI 28.52 kg/m    Body mass index is 28.52 kg/m .  Wt Readings from Last 2 Encounters:   06/29/21 63.5 kg (140 lb)   06/16/21 60.3 kg (133 lb)     Constitutional: no acute distress, " pleasant and cooperative, appears overall well.  Eyes: sclera white, conjunctiva clear, without icterus or pallor   Cardiovascular: RRR. Normal S1/S2. JVP not elevated. Extremities with no edema at present  Respiratory: clear to auscultation bilaterally  Gastrointestinal: soft, nontender, non distended  Musculoskeletal: normal muscle bulk and tone, joints   Skin: normal skin appearance without worrisome lesions.   Neurologic: AOx3, gait smooth and symmetric  Psychiatric: appropriate affect, eye contact, intact thought and speech      Laboratory Data:  LIPID RESULTS:  Recent Labs   Lab Test 05/04/17 0814   CHOL 171   HDL 91   LDL 69   TRIG 57        LIVER ENZYME RESULTS:  Recent Labs   Lab Test 05/07/20 2108   AST 18   ALT 28       CBC RESULTS:  Recent Labs   Lab Test 05/07/20 2108   WBC 7.0   HGB 12.5   HCT 38.5          BMP RESULTS:  Recent Labs   Lab Test 05/07/20 2108 05/04/17 0814     --    POTASSIUM 4.1  --    CHLORIDE 106  --    CO2 30  --    ANIONGAP 4  --    GLC 75 85   BUN 23  --    CR 0.74  --    ALEXSANDRA 8.8  --        Pertinent Procedures/Imaging:  N/A      Assessment:  Inocente Gutierrez is a 46 year old female with PMH notable for MDD, HTN, fam hx of WPW, and asthma who presents for clinic follow up.    46-year-old female with uncontrolled hypertension.  Lost to follow-up x1 year.  And concerned that she is having symptoms of hypertensive urgency at home given that she has intermittent dizziness, lower extremity swelling, and headaches.  However, she has no such symptoms during our visit today.  I would like to pursue a work-up of secondary hypertension, but in the meantime we will start antihypertensive therapy given how high her blood pressures are.    Plan:  # HTN, uncontrolled  - start 5mg amlodipine daily  - patient to take BP once per day - she will call our clinic if systolic BP remains above 150 despite initiation of amlodipine therapy  - follow up July 20th with me  - secondary HTN  work up: echocardiogram, sleep study, labs (BMP, renin/aldosterone, cortisol, calcium, TSH with reflex T4)      Follow-up: July 20th with me    A total of 22 minutes spent face to face with patient. An additional 15 minutes was spent providing coordination of care, chart review, and documentation    Bessie Parker PA-C  Interventional/Critical Care Cardiology  [phone]        CC  Patient Care Team:  [facility], [facility] as PCP - General (Clinic)  Sheldon Corrigan MD as Assigned PCP  Eulalio Thomas MD as Assigned Surgical Provider  [facility] as Assigned Heart and Vascular Provider  Jsesica Harrington, APRN CNP as Assigned OBGYN Provider  SONNY CHAO

## 2021-06-29 ENCOUNTER — OFFICE VISIT (OUTPATIENT)
Dept: CARDIOLOGY | Facility: CLINIC | Age: 46
End: 2021-06-29
Payer: COMMERCIAL

## 2021-06-29 VITALS
HEART RATE: 79 BPM | WEIGHT: 140 LBS | BODY MASS INDEX: 28.52 KG/M2 | SYSTOLIC BLOOD PRESSURE: 149 MMHG | DIASTOLIC BLOOD PRESSURE: 81 MMHG | OXYGEN SATURATION: 100 %

## 2021-06-29 DIAGNOSIS — I10 BENIGN ESSENTIAL HYPERTENSION: ICD-10-CM

## 2021-06-29 DIAGNOSIS — I10 BENIGN ESSENTIAL HYPERTENSION: Primary | ICD-10-CM

## 2021-06-29 LAB
ANION GAP SERPL CALCULATED.3IONS-SCNC: 5 MMOL/L (ref 3–14)
BUN SERPL-MCNC: 21 MG/DL (ref 7–30)
CALCIUM SERPL-MCNC: 8.6 MG/DL (ref 8.5–10.1)
CHLORIDE SERPL-SCNC: 106 MMOL/L (ref 94–109)
CO2 SERPL-SCNC: 28 MMOL/L (ref 20–32)
CORTIS SERPL-MCNC: 7.1 UG/DL (ref 4–22)
CREAT SERPL-MCNC: 0.7 MG/DL (ref 0.52–1.04)
GFR SERPL CREATININE-BSD FRML MDRD: >90 ML/MIN/{1.73_M2}
GLUCOSE SERPL-MCNC: 90 MG/DL (ref 70–99)
POTASSIUM SERPL-SCNC: 3.8 MMOL/L (ref 3.4–5.3)
SODIUM SERPL-SCNC: 139 MMOL/L (ref 133–144)
TSH SERPL DL<=0.005 MIU/L-ACNC: 1.82 MU/L (ref 0.4–4)

## 2021-06-29 PROCEDURE — 80048 BASIC METABOLIC PNL TOTAL CA: CPT | Performed by: PHYSICIAN ASSISTANT

## 2021-06-29 PROCEDURE — 99000 SPECIMEN HANDLING OFFICE-LAB: CPT | Performed by: PHYSICIAN ASSISTANT

## 2021-06-29 PROCEDURE — 36415 COLL VENOUS BLD VENIPUNCTURE: CPT | Performed by: PHYSICIAN ASSISTANT

## 2021-06-29 PROCEDURE — 99N1160 PR STATISICAL ALDOSTERONE/RENIN RATIO: Performed by: PHYSICIAN ASSISTANT

## 2021-06-29 PROCEDURE — 84244 ASSAY OF RENIN: CPT | Mod: 90 | Performed by: PHYSICIAN ASSISTANT

## 2021-06-29 PROCEDURE — 82088 ASSAY OF ALDOSTERONE: CPT | Performed by: PHYSICIAN ASSISTANT

## 2021-06-29 PROCEDURE — 99214 OFFICE O/P EST MOD 30 MIN: CPT | Performed by: PHYSICIAN ASSISTANT

## 2021-06-29 PROCEDURE — 82533 TOTAL CORTISOL: CPT | Performed by: PHYSICIAN ASSISTANT

## 2021-06-29 PROCEDURE — 84443 ASSAY THYROID STIM HORMONE: CPT | Performed by: PHYSICIAN ASSISTANT

## 2021-06-29 RX ORDER — AMLODIPINE BESYLATE 5 MG/1
5 TABLET ORAL DAILY
Qty: 60 TABLET | Refills: 1 | Status: SHIPPED | OUTPATIENT
Start: 2021-06-29 | End: 2023-11-28

## 2021-06-29 NOTE — LETTER
"6/29/2021      RE: Inocetne Gutierrez  [address] [facility] [place] [address]       Dear Colleague,    Thank you for the opportunity to participate in the care of your patient, Inocente Gutierrez, at the [facility]. Please see a copy of my visit note below.      [facility] - [facility]  Cardiovascular Clinic Note      Referring Provider: SONNY Garcia   Primary Care Provider: [facility]     Patient Name: Inocente Gutierrez   MRN: [id number]       History of Present Illness:  Inocente Gutierrez is a 46 year old female with PMH notable for MDD, HTN, fam hx of WPW, and asthma who presents for clinic follow up.    The patient was last seen 5/2020 with Dr. Colmenares.  At that visit, her blood pressure was quite elevated - as such, they pursued a sleep study and echocardiogram. The echocardiogram was not completed. Sleep study was not completed. Since that point in time, the patient has been seen in urgent care 1 time for an episode of chest discomfort.  She was found to have normal sinus rhythm on her EKG, but there was concern for previous cardiovascular event.  It was suggested that she be transferred to the emergency department, but the patient declined and went home.    In regards to the patient's hypertension, she continues to have quite elevated blood pressures.  Her systolic blood pressure is typically between 150 and 180 when she intermittently checks at home.  Today in clinic, the patient denies recent chest pain.  She has mild exertional dyspnea at baseline which she attributes to her asthma.  She has noted some improvement to her shortness of breath since smoking cessation in January 2021.  The patient experiences lower extremity swelling on a daily basis-she notes that this is worst at the end of the day.  She also notes that her heart will sometimes \"get stuck\".  She seems to ascribe these to " "palpitations.  She notes that this happens for a period 3-4 beats, sometimes once per week.  Nothing really seems to bring these on.  She denies any lightheadedness or chest pain with these episodes.  The patient does experience dizziness as well on a somewhat regular basis.  She notes that this will sometimes happen when she is \"just standing\".  This happens for a period of seconds and then resolves spontaneously.  The patient notes that she has near daily headaches.  These are typically located behind her ears and she describes this as a \"squeezing\" sensation.  However, she notes that she will \"sometimes\" have pain \"right behind her eyes\".  She is unsure how often she has these other types of headaches.    Pertinent Cardiac Medications:  N/A      Past Medical History:  Pertinent past medical history as above.      Past Surgical History:  Past Surgical History:   Procedure Laterality Date     APPENDECTOMY       LEEP TX, CERVICAL  2003    result unknown     TONSILLECTOMY           Family History:  Family History   Problem Relation Age of Onset     Diabetes Mother      Chronic Obstructive Pulmonary Disease Mother      Parkinsonism Mother         greene     Glaucoma No family hx of      Macular Degeneration No family hx of          Current Medications:  Current Outpatient Medications   Medication Sig Dispense Refill     albuterol (PROAIR HFA/PROVENTIL HFA/VENTOLIN HFA) 108 (90 Base) MCG/ACT inhaler Inhale 2 puffs into the lungs every 4 hours as needed for shortness of breath / dyspnea or wheezing 1 Inhaler 3     albuterol (PROVENTIL) (2.5 MG/3ML) 0.083% neb solution Take 1 vial (2.5 mg) by nebulization every 6 hours as needed for shortness of breath / dyspnea or wheezing 1 Box 0     amLODIPine (NORVASC) 5 MG tablet Take 1 tablet (5 mg) by mouth daily 60 tablet 1     FLUoxetine (PROZAC) 40 MG capsule Take 1 capsule (40 mg) by mouth daily 90 capsule 0         Social History:  Works in construction    Physical Exam:  BP " (!) 157/80 (BP Location: Left arm, Patient Position: Chair, Cuff Size: Adult Regular)   Pulse 79   Wt 63.5 kg (140 lb)   LMP 05/31/2021 (Exact Date)   SpO2 100%   BMI 28.52 kg/m    Body mass index is 28.52 kg/m .  Wt Readings from Last 2 Encounters:   06/29/21 63.5 kg (140 lb)   06/16/21 60.3 kg (133 lb)     Constitutional: no acute distress, pleasant and cooperative, appears overall well.  Eyes: sclera white, conjunctiva clear, without icterus or pallor   Cardiovascular: RRR. Normal S1/S2. JVP not elevated. Extremities with no edema at present  Respiratory: clear to auscultation bilaterally  Gastrointestinal: soft, nontender, non distended  Musculoskeletal: normal muscle bulk and tone, joints   Skin: normal skin appearance without worrisome lesions.   Neurologic: AOx3, gait smooth and symmetric  Psychiatric: appropriate affect, eye contact, intact thought and speech      Laboratory Data:  LIPID RESULTS:  Recent Labs   Lab Test 05/04/17 0814   CHOL 171   HDL 91   LDL 69   TRIG 57        LIVER ENZYME RESULTS:  Recent Labs   Lab Test 05/07/20 2108   AST 18   ALT 28       CBC RESULTS:  Recent Labs   Lab Test 05/07/20 2108   WBC 7.0   HGB 12.5   HCT 38.5          BMP RESULTS:  Recent Labs   Lab Test 05/07/20 2108 05/04/17 0814     --    POTASSIUM 4.1  --    CHLORIDE 106  --    CO2 30  --    ANIONGAP 4  --    GLC 75 85   BUN 23  --    CR 0.74  --    ALEXSANDRA 8.8  --        Pertinent Procedures/Imaging:  N/A      Assessment:  Inocente Gutierrez is a 46 year old female with PMH notable for MDD, HTN, fam hx of WPW, and asthma who presents for clinic follow up.    46-year-old female with uncontrolled hypertension.  Lost to follow-up x1 year.  And concerned that she is having symptoms of hypertensive urgency at home given that she has intermittent dizziness, lower extremity swelling, and headaches.  However, she has no such symptoms during our visit today.  I would like to pursue a work-up of secondary  hypertension, but in the meantime we will start antihypertensive therapy given how high her blood pressures are.    Plan:  # HTN, uncontrolled  - start 5mg amlodipine daily  - patient to take BP once per day - she will call our clinic if systolic BP remains above 150 despite initiation of amlodipine therapy  - follow up July 20th with me  - secondary HTN work up: echocardiogram, sleep study, labs (BMP, renin/aldosterone, cortisol, calcium, TSH with reflex T4)      Follow-up: July 20th with me    A total of 22 minutes spent face to face with patient. An additional 15 minutes was spent providing coordination of care, chart review, and documentation    Bessie Parker PA-C  Interventional/Critical Care Cardiology  [phone]    CC  Patient Care Team:  [facility], [facility] as PCP - General (Clinic)  Sheldon Corrigan MD as Assigned PCP  Eulalio Thomas MD as Assigned Surgical Provider  [facility] as Assigned Heart and Vascular Provider  eJssica Harrington APRN CNP as Assigned OBGYN Provider  SONNY CHAO

## 2021-06-29 NOTE — PATIENT INSTRUCTIONS
Thank you for coming to the [facility]; please note the following instructions:    1. Labs in the next few days  2. Echocardiogram in the next few weeks  3. Sleep study in the next few weeks  4. Follow up with me July 20th  5. Start 5mg amlodipine daily. Take this medication in the evening  6.  Please continue taking blood pressure once per day.  Please take this in the morning - first thing upon waking up before eating breakfast/showering/drinking coffee        If you have any questions regarding your visit please contact your care team:     Cardiology  Telephone Number   Marycarmen BENTON, RN  Hamida KAPLAN, RN   Jolie LYNNE, RMJAYDEN TAYLOR, RMA  Gissel FAN, LPN   [phone] (option 1)   For scheduling appts:     [phone] (select option 1)       For the Device Clinic (Pacemakers and ICD's)  RN's :  Cleo Ireland   During business hours: [phone]    *After business hours:  [phone] (select option 4)      Normal test result notifications will be released via To8to or mailed within 7 business days.  All other test results, will be communicated via telephone once reviewed by your cardiologist.    If you need a medication refill please contact your pharmacy.  Please allow 3 business days for your refill to be completed.    As always, thank you for trusting us with your health care needs!

## 2021-06-29 NOTE — NURSING NOTE
"Chief Complaint   Patient presents with     RECHECK     annual follow up. Pt reports SOB, lightheadedness.        Initial BP (!) 157/80 (BP Location: Left arm, Patient Position: Chair, Cuff Size: Adult Regular)   Pulse 79   Wt 63.5 kg (140 lb)   LMP 05/31/2021 (Exact Date)   SpO2 100%   BMI 28.52 kg/m   Estimated body mass index is 28.52 kg/m  as calculated from the following:    Height as of 6/16/21: 1.492 m (4' 10.75\").    Weight as of this encounter: 63.5 kg (140 lb)..  BP completed using cuff size: regular    Estefania Briscoe MA  "

## 2021-07-01 LAB — ALDOST SERPL-MCNC: 6.4 NG/DL (ref 0–31)

## 2021-07-06 LAB
ALDOSTERONE RENIN RATIO: 1.2 (ref 0–25)
RENIN PLAS-CCNC: 5.2 NG/ML/HR

## 2021-07-26 ENCOUNTER — ANCILLARY PROCEDURE (OUTPATIENT)
Dept: CARDIOLOGY | Facility: CLINIC | Age: 46
End: 2021-07-26
Attending: PHYSICIAN ASSISTANT
Payer: COMMERCIAL

## 2021-07-26 DIAGNOSIS — I10 BENIGN ESSENTIAL HYPERTENSION: ICD-10-CM

## 2021-07-26 LAB — LVEF ECHO: NORMAL

## 2021-07-26 PROCEDURE — 93306 TTE W/DOPPLER COMPLETE: CPT | Performed by: INTERNAL MEDICINE

## 2021-08-17 ENCOUNTER — VIRTUAL VISIT (OUTPATIENT)
Dept: SLEEP MEDICINE | Facility: CLINIC | Age: 46
End: 2021-08-17
Attending: PHYSICIAN ASSISTANT
Payer: COMMERCIAL

## 2021-08-17 DIAGNOSIS — I10 BENIGN ESSENTIAL HYPERTENSION: ICD-10-CM

## 2021-08-17 DIAGNOSIS — G47.10 HYPERSOMNIA: Primary | ICD-10-CM

## 2021-08-17 DIAGNOSIS — R29.818 SUSPECTED SLEEP APNEA: ICD-10-CM

## 2021-08-17 DIAGNOSIS — G47.419 NARCOLEPSY WITHOUT CATAPLEXY(347.00): ICD-10-CM

## 2021-08-17 PROCEDURE — 99203 OFFICE O/P NEW LOW 30 MIN: CPT | Mod: 95 | Performed by: PHYSICIAN ASSISTANT

## 2021-08-17 NOTE — PATIENT INSTRUCTIONS
"      MY TREATMENT INFORMATION FOR SLEEP APNEA-  Inocente Gutierrez    DOCTOR : LIOR Bertrand-AVERY    Am I having a sleep study at a sleep center?  --->Due to insurance clearance delays, you will be contacted within 2-4 weeks to schedule    Am I having a home sleep study?  --->Watch the video for the device you are using:    -/drop off device-   https://www.EchoPixel.com/watch?v=yGGFBdELGhk    -Disposable device sent out require phone/computer application-   https://www.EchoPixel.com/watch?v=BCce_vbiwxE      Frequently asked questions:  1. What is Obstructive Sleep Apnea (MICKI)? MICKI is the most common type of sleep apnea. Apnea means, \"without breath.\"  Apnea is most often caused by narrowing or collapse of the upper airway as muscles relax during sleep.   Almost everyone has occasional apneas. Most people with sleep apnea have had brief interruptions at night frequently for many years.  The severity of sleep apnea is related to how frequent and severe the events are.   2. What are the consequences of MICKI? Symptoms include: feeling sleepy during the day, snoring loudly, gasping or stopping of breathing, trouble sleeping, and occasionally morning headaches or heartburn at night.  Sleepiness can be serious and even increase the risk of falling asleep while driving. Other health consequences may include development of high blood pressure and other cardiovascular disease in persons who are susceptible. Untreated MICKI  can contribute to heart disease, stroke and diabetes.   3. What are the treatment options? In most situations, sleep apnea is a lifelong disease that must be managed with daily therapy. Medications are not effective for sleep apnea and surgery is generally not considered until other therapies have been tried. Your treatment is your choice . Continuous Positive Airway (CPAP) works right away and is the therapy that is effective in nearly everyone. An oral device to hold your jaw forward is usually the " next most reliable option. Other options include postioning devices (to keep you off your back), weight loss, and surgery including a tongue pacing device. There is more detail about some of these options below.  4. Are my sleep studies covered by insurance? Although we will request verification of coverage, we advise you also check in advance of the study to ensure there is coverage.    Important tips for those choosing CPAP and similar devices   Know your equipment:  CPAP is continuous positive airway pressure that prevents obstructive sleep apnea by keeping the throat from collapsing while you are sleeping. In most cases, the device is  smart  and can slowly self-adjusts if your throat collapses and keeps a record every day of how well you are treated-this information is available to you and your care team.  BPAP is bilevel positive airway pressure that keeps your throat open and also assists each breath with a pressure boost to maintain adequate breathing.  Special kinds of BPAP are used in patients who have inadequate breathing from lung or heart disease. In most cases, the device is  smart  and can slowly self-adjusts to assist breathing. Like CPAP, the device keeps a record of how well you are treated.  Your mask is your connection to the device. You get to choose what feels most comfortable and the staff will help to make sure if fits. Here: are some examples of the different masks that are available:       Key points to remember on your journey with sleep apnea:  1. Sleep study.  PAP devices often need to be adjusted during a sleep study to show that they are effective and adjusted right.  2. Good tips to remember: Try wearing just the mask during a quiet time during the day so your body adapts to wearing it. A humidifier is recommended for comfort in most cases to prevent drying of your nose and throat. Allergy medication from your provider may help you if you are having nasal congestion.  3. Getting  settled-in. It takes more than one night for most of us to get used to wearing a mask. Try wearing just the mask during a quiet time during the day so your body adapts to wearing it. A humidifier is recommended for comfort in most cases. Our team will work with you carefully on the first day and will be in contact within 4 days and again at 2 and 4 weeks for advice and remote device adjustments. Your therapy is evaluated by the device each day.   4. Use it every night. The more you are able to sleep naturally for 7-8 hours, the more likely you will have good sleep and to prevent health risks or symptoms from sleep apnea. Even if you use it 4 hours it helps. Occasionally all of us are unable to use a medical therapy, in sleep apnea, it is not dangerous to miss one night.   5. Communicate. Call our skilled team on the number provided on the first day if your visit for problems that make it difficult to wear the device. Over 2 out of 3 patients can learn to wear the device long-term with help from our team. Remember to call our team or your sleep providers if you are unable to wear the device as we may have other solutions for those who cannot adapt to mask CPAP therapy. It is recommended that you sleep your sleep provider within the first 3 months and yearly after that if you are not having problems.   6. Use it for your health. We encourage use of CPAP masks during daytime quiet periods to allow your face and brain to adapt to the sensation of CPAP so that it will be a more natural sensation to awaken to at night or during naps. This can be very useful during the first few weeks or months of adapting to CPAP though it does not help medically to wear CPAP during wakefulness and  should not be used as a strategy just to meet guidelines.  7. Take care of your equipment. Make sure you clean your mask and tubing using directions every day and that your filter and mask are replaced as recommended or if they are not  working.     BESIDES CPAP, WHAT OTHER THERAPIES ARE THERE?    Positioning Device  Positioning devices are generally used when sleep apnea is mild and only occurs on your back.This example shows a pillow that straps around the waist. It may be appropriate for those whose sleep study shows milder sleep apnea that occurs primarily when lying flat on one's back. Preliminary studies have shown benefit but effectiveness at home may need to be verified by a home sleep test. These devices are generally not covered by medical insurance.  Examples of devices that maintain sleeping on the back to prevent snoring and mild sleep apnea.    Belt type body positioner  http://Groove.Verosee/    Electronic reminder  http://nightshifttherapy.com/  http://www.ProFounder.Verosee.au/      Oral Appliance  What is oral appliance therapy?  An oral appliance device fits on your teeth at night like a retainer used after having braces. The device is made by a specialized dentist and requires several visits over 1-2 months before a manufactured device is made to fit your teeth and is adjusted to prevent your sleep apnea. Once an oral device is working properly, snoring should be improved. A home sleep test may be recommended at that time if to determine whether the sleep apnea is adequately treated.       Some things to remember:  -Oral devices are often, but not always, covered by your medical insurance. Be sure to check with your insurance provider.   -If you are referred for oral therapy, you will be given a list of specialized dentists to consider or you may choose to visit the Web site of the American Academy of Dental Sleep Medicine  -Oral devices are less likely to work if you have severe sleep apnea or are extremely overweight.     More detailed information  An oral appliance is a small acrylic device that fits over the upper and lower teeth  (similar to a retainer or a mouth guard). This device slightly moves jaw forward, which moves the base  of the tongue forward, opens the airway, improves breathing for effective treat snoring and obstructive sleep apnea in perhaps 7 out of 10 people .  The best working devices are custom-made by a dental device  after a mold is made of the teeth 1, 2, 3.  When is an oral appliance indicated?  Oral appliance therapy is recommended as a first-line treatment for patients with primary snoring, mild sleep apnea, and for patients with moderate sleep apnea who prefer appliance therapy to use of CPAP4, 5. Severity of sleep apnea is determined by sleep testing and is based on the number of respiratory events per hour of sleep.   How successful is oral appliance therapy?  The success rate of oral appliance therapy in patients with mild sleep apnea is 75-80% while in patients with moderate sleep apnea it is 50-70%. The chance of success in patients with severe sleep apnea is 40-50%. The research also shows that oral appliances have a beneficial effect on the cardiovascular health of MICKI patients at the same magnitude as CPAP therapy7.  Oral appliances should be a second-line treatment in cases of severe sleep apnea, but if not completely successful then a combination therapy utilizing CPAP plus oral appliance therapy may be effective. Oral appliances tend to be effective in a broad range of patients although studies show that the patients who have the highest success are females, younger patients, those with milder disease, and less severe obesity. 3, 6.   Finding a dentist that practices dental sleep medicine  Specific training is available through the American Academy of Dental Sleep Medicine for dentists interested in working in the field of sleep. To find a dentist who is educated in the field of sleep and the use of oral appliances, near you, visit the Web site of the American Academy of Dental Sleep Medicine.    References  1. nessa Bingham al. Objectively measured vs self-reported compliance during oral  appliance therapy for sleep-disordered breathing. Chest 2013; 144(5): 6939-5603.  2. Alejandro, et al. Objective measurement of compliance during oral appliance therapy for sleep-disordered breathing. Thorax 2013; 68(1): 91-96.  3. Dangelo et al. Mandibular advancement devices in 620 men and women with MICKI and snoring: tolerability and predictors of treatment success. Chest 2004; 125: 7060-5935.  4. Ace, et al. Oral appliances for snoring and MICKI: a review. Sleep 2006; 29: 244-262.  5. Elenita et al. Oral appliance treatment for MICKI: an update. J Clin Sleep Med 2014; 10(2): 215-227.  6. Rosa et al. Predictors of OSAH treatment outcome. J Dent Res 2007; 86: 5345-4349.      Weight Loss:    Weight loss is a long-term strategy that may improve sleep apnea in some patients.    Weight management is a personal decision and the decision should be based on your interest and the potential benefits.  If you are interested in exploring weight loss strategies, the following discussion covers the impact on weight loss on sleep apnea and the approaches that may be successful.    Being overweight does not necessarily mean you will have health consequences.  Those who have BMI over 35 or over 27 with existing medical conditions carries greater risk.   Weight loss decreases severity of sleep apnea in most people with obesity. For those with mild obesity who have developed snoring with weight gain, even 15-30 pound weight loss can improve and occasionally eliminate sleep apnea.  Structured and life-long dietary and health habits are necessary to lose weight and keep healthier weight levels.     Though there may be significant health benefits from weight loss, long-term weight loss is very difficult to achieve- studies show success with dietary management in less than 10% of people. In addition, substantial weight loss may require years of dietary control and may be difficult if patients have severe obesity. In these  cases, surgical management may be considered.  Finally, older individuals who have tolerated obesity without health complications may be less likely to benefit from weight loss strategies.        Your BMI is There is no height or weight on file to calculate BMI.  Weight management is a personal decision.  If you are interested in exploring weight loss strategies, the following discussion covers the approaches that may be successful. Body mass index (BMI) is one way to tell whether you are at a healthy weight, overweight, or obese. It measures your weight in relation to your height.  A BMI of 18.5 to 24.9 is in the healthy range. A person with a BMI of 25 to 29.9 is considered overweight, and someone with a BMI of 30 or greater is considered obese. More than two-thirds of American adults are considered overweight or obese.  Being overweight or obese increases the risk for further weight gain. Excess weight may lead to heart disease and diabetes.  Creating and following plans for healthy eating and physical activity may help you improve your health.  Weight control is part of healthy lifestyle and includes exercise, emotional health, and healthy eating habits. Careful eating habits lifelong are the mainstay of weight control. Though there are significant health benefits from weight loss, long-term weight loss with diet alone may be very difficult to achieve- studies show long-term success with dietary management in less than 10% of people. Attaining a healthy weight may be especially difficult to achieve in those with severe obesity. In some cases, medications, devices and surgical management might be considered.  What can you do?  If you are overweight or obese and are interested in methods for weight loss, you should discuss this with your provider.     Consider reducing daily calorie intake by 500 calories.     Keep a food journal.     Avoiding skipping meals, consider cutting portions instead.    Diet combined  with exercise helps maintain muscle while optimizing fat loss. Strength training is particularly important for building and maintaining muscle mass. Exercise helps reduce stress, increase energy, and improves fitness. Increasing exercise without diet control, however, may not burn enough calories to loose weight.       Start walking three days a week 10-20 minutes at a time    Work towards walking thirty minutes five days a week     Eventually, increase the speed of your walking for 1-2 minutes at time    In addition, we recommend that you review healthy lifestyles and methods for weight loss available through the National Institutes of Health patient information sites:  http://win.niddk.nih.gov/publications/index.htm    And look into health and wellness programs that may be available through your health insurance provider, employer, local community center, or [employer] club.          Surgery:    Surgery for obstructive sleep apnea is considered generally only when other therapies fail to work. Surgery may be discussed with you if you are having a difficult time tolerating CPAP and or when there is an abnormal structure that requires surgical correction.  Nose and throat surgeries often enlarge the airway to prevent collapse.  Most of these surgeries create pain for 1-2 weeks and up to half of the most common surgeries are not effective throughout life.  You should carefully discuss the benefits and drawbacks to surgery with your sleep provider and surgeon to determine if it is the best solution for you.   More information  Surgery for MICKI is directed at areas that are responsible for narrowing or complete obstruction of the airway during sleep.  There are a wide range of procedures available to enlarge and/or stabilize the airway to prevent blockage of breathing in the three major areas where it can occur: the palate, tongue, and nasal regions.  Successful surgical treatment depends on the accurate identification of  the factors responsible for obstructive sleep apnea in each person.  A personalized approach is required because there is no single treatment that works well for everyone.  Because of anatomic variation, consultation with an examination by a sleep surgeon is a critical first step in determining what surgical options are best for each patient.  In some cases, examination during sedation may be recommended in order to guide the selection of procedures.  Patients will be counseled about risks and benefits as well as the typical recovery course after surgery. Surgery is typically not a cure for a person s MICKI.  However, surgery will often significantly improve one s MICKI severity (termed  success rate ).  Even in the absence of a cure, surgery will decrease the cardiovascular risk associated with OSA7; improve overall quality of life8 (sleepiness, functionality, sleep quality, etc).      Palate Procedures:  Patients with MICKI often have narrowing of their airway in the region of their tonsils and uvula.  The goals of palate procedures are to widen the airway in this region as well as to help the tissues resist collapse.  Modern palate procedure techniques focus on tissue conservation and soft tissue rearrangement, rather than tissue removal.  Often the uvula is preserved in this procedure. Residual sleep apnea is common in patient after pharyngoplasty with an average reduction in sleep apnea events of 33%2.      Tongue Procedures:  ExamWhile patients are awake, the muscles that surround the throat are active and keep this region open for breathing. These muscles relax during sleep, allowing the tongue and other structures to collapse and block breathing.  There are several different tongue procedures available.  Selection of a tongue base procedure depends on characteristics seen on physical exam.  Generally, procedures are aimed at removing bulky tissues in this area or preventing the back of the tongue from falling back  during sleep.  Success rates for tongue surgery range from 50-62%3.    Hypoglossal Nerve Stimulation:  Hypoglossal nerve stimulation has recently received approval from the [place] Food and Drug Administration for the treatment of obstructive sleep apnea.  This is based on research showing that the system was safe and effective in treating sleep apnea6.  Results showed that the median AHI score decreased 68%, from 29.3 to 9.0. This therapy uses an implant system that senses breathing patterns and delivers mild stimulation to airway muscles, which keeps the airway open during sleep.  The system consists of three fully implanted components: a small generator (similar in size to a pacemaker), a breathing sensor, and a stimulation lead.  Using a small handheld remote, a patient turns the therapy on before bed and off upon awakening.    Candidates for this device must be greater than 22 years of age, have moderate to severe MICKI (AHI between 20-65), BMI less than 32, have tried CPAP/oral appliance without success, and have appropriate upper airway anatomy (determined by a sleep endoscopy performed by Dr. Cuellar).    Hypoglossal Nerve Stimulation Pathway:    The sleep surgeon s office will work with the patient through the insurance prior-authorization process (including communications and appeals).    Nasal Procedures:  Nasal obstruction can interfere with nasal breathing during the day and night.  Studies have shown that relief of nasal obstruction can improve the ability of some patients to tolerate positive airway pressure therapy for obstructive sleep apnea1.  Treatment options include medications such as nasal saline, topical corticosteroid and antihistamine sprays, and oral medications such as antihistamines or decongestants. Non-surgical treatments can include external nasal dilators for selected patients. If these are not successful by themselves, surgery can improve the nasal airway either alone or in  combination with these other options.      Combination Procedures:  Combination of surgical procedures and other treatments may be recommended, particularly if patients have more than one area of narrowing or persistent positional disease.  The success rate of combination surgery ranges from 66-80%2,3.    References  1. Maria Antonia VARELA. The Role of the Nose in Snoring and Obstructive Sleep Apnoea: An Update.  Eur Arch Otorhinolaryngol. 2011; 268: 1365-73.  2.  Arely SM; Landen JA; Arian JR; Pallanch JF; Ashley MB; Mary SG; Diandra AVILA. Surgical modifications of the upper airway for obstructive sleep apnea in adults: a systematic review and meta-analysis. SLEEP 2010;33(10):4924-0196. Mandie JOHNSTON. Hypopharyngeal surgery in obstructive sleep apnea: an evidence-based medicine review.  Arch Otolaryngol Head Neck Surg. 2006 Feb;132(2):206-13.  3. Aubrey YH1, Chioma Y, Meir JOO. The efficacy of anatomically based multilevel surgery for obstructive sleep apnea. Otolaryngol Head Neck Surg. 2003 Oct;129(4):327-35.  4. Mandie JOHNSTON, Goldberg A. Hypopharyngeal Surgery in Obstructive Sleep Apnea: An Evidence-Based Medicine Review. Arch Otolaryngol Head Neck Surg. 2006 Feb;132(2):206-13.  5. Kirt LEONARDO et al. Upper-Airway Stimulation for Obstructive Sleep Apnea.  N Engl J Med. 2014 Jan 9;370(2):139-49.  6. Ramo Y et al. Increased Incidence of Cardiovascular Disease in Middle-aged Men with Obstructive Sleep Apnea. Am J Respir Crit Care Med; 2002 166: 159-165  7. Monroy EM et al. Studying Life Effects and Effectiveness of Palatopharyngoplasty (SLEEP) study: Subjective Outcomes of Isolated Uvulopalatopharyngoplasty. Otolaryngol Head Neck Surg. 2011; 144: 623-631.    Drive Safe... Drive Alive     Sleep health profoundly affects your health, mood, and your safety.  Thirty three percent of the population (one in three of us) is not getting enough sleep and many have a sleep disorder. Not getting enough sleep or having an untreated /  undertreated sleep condition may make us sleepy without even knowing it. In fact, our driving could be dramatically impaired due to our sleep health. As your provider, here are some things I would like you to know about driving:     Here are some warning signs for impairment and dangerous drowsy driving:              -Having been awake more than 16 hours               -Looking tired               -Eyelid drooping              -Head nodding (it could be too late at this point)              -Driving for more than 30 minutes     Some things you could do to make the driving safer if you are experiencing some drowsiness:              -Stop driving and rest              -Call for transportation              -Make sure your sleep disorder is adequately treated     Some things that have been shown NOT to work when experiencing drowsiness while driving:              -Turning on the radio              -Opening windows              -Eating any  distracting  /  entertaining  foods (e.g., sunflower seeds, candy, or any other)              -Talking on the phone      Your decision may not only impact your life, but also the life of others. Please, remember to drive safe for yourself and all of us.

## 2021-08-17 NOTE — PROGRESS NOTES
[place] Sleep Scale: 20    Does Inocente have a CPAP/Bipap?  No     Inocente is a 46 year old who is being evaluated via a billable video visit.      How would you like to obtain your AVS? MyChart  If the video visit is dropped, the invitation should be resent by: Text to cell phone: [phone]   Will anyone else be joining your video visit? No      Vitals:  No vitals were obtained today due to virtual visit.      Video-Visit Details    Type of service:  Video Visit     Video start time 0845 End Time:0915 AM    Originating Location (pt. Location): Home    Distant Location (provider location):  [facility]     Platform used for Video Visit: Android App Review Source  Sleep Consultation:    Date on this visit: 8/17/2021    Inocente Gutierrez is sent by Bessie Parker for a sleep consultation regarding hypersomnolence, snoring, htn.     Primary Physician: Clinic, [facility]     Inocente Gutierrez reports nightly snoring for many years. .     Inocente goes to sleep at 11:00 PM during the week. She wakes up at 4:00 AM with an alarm. She falls asleep in 5 minutes.  Inocente denies difficulty falling asleep.  She wakes up 1-2 times a night for 5 minutes before falling back to sleep.  Inocente wakes up to go to the bathroom.  On weekends, Inocente goes to sleep at 11:00 PM.  She wakes up at 11:00 AM without an alarm. She falls asleep in 5 minutes.  Patient gets an average of 6 hours of sleep per night.     Patient does watch TV in bed.     Inocente does not do shift work.  She works day shifts.      Inocente does snore every night. Patient does have a regular bed partner. There is report of snoring.  She does not have witnessed apneas. They occasionally sleep separately.  Patient sleeps on her side. She has occasional morning dry mouth,. Inocente has frequent sleep talking 2 times per week and occasional sleep paralysis, cataplexy and hypnogogic/hypnopompic hallucinations.    She confirms enuresis and sleep terrors as a  child.  Inocente has depression and anxiety.      Inocente has gained 5-10 pounds in the last year.  Patient describes themself as a night person.  She would prefer to go to sleep at 1:30 AM and wake up at 8:00 PM.  Patient's [place] Sleepiness score 20/24 consistent with excessive  daytime sleepiness.      Inocente naps 0 times per week  She takes frequent inadvertant naps.  She admits falling asleep while driving. The most recent episode was 1 day(s) ago.  Patient was counseled on the importance of driving while alert, to pull over if drowsy, or nap before getting into the vehicle if sleepy.  She uses 1 cups/day of coffee, 1 energy drinks/day. Last caffeine intake is usually before noon.    Allergies:    No Known Allergies    Medications:    Current Outpatient Medications   Medication Sig Dispense Refill     albuterol (PROAIR HFA/PROVENTIL HFA/VENTOLIN HFA) 108 (90 Base) MCG/ACT inhaler Inhale 2 puffs into the lungs every 4 hours as needed for shortness of breath / dyspnea or wheezing 1 Inhaler 3     albuterol (PROVENTIL) (2.5 MG/3ML) 0.083% neb solution Take 1 vial (2.5 mg) by nebulization every 6 hours as needed for shortness of breath / dyspnea or wheezing 1 Box 0     amLODIPine (NORVASC) 5 MG tablet Take 1 tablet (5 mg) by mouth daily 60 tablet 1     FLUoxetine (PROZAC) 40 MG capsule Take 1 capsule (40 mg) by mouth daily 90 capsule 0       Problem List:  Patient Active Problem List    Diagnosis Date Noted     Elevated blood pressure reading without diagnosis of hypertension 04/29/2021     Priority: Medium     Family history of Nabil-Parkinson-White (WPW) syndrome 05/07/2020     Priority: Medium     Mild episode of recurrent major depressive disorder (H) 04/21/2017     Priority: Medium     Pain in both wrists 04/21/2017     Priority: Medium     Asthma 06/12/2014     Priority: Medium        Past Medical/Surgical History:  Past Medical History:   Diagnosis Date     Depressive disorder      Hypertension       Uncomplicated asthma      Past Surgical History:   Procedure Laterality Date     APPENDECTOMY       LEEP TX, CERVICAL  2003    result unknown     TONSILLECTOMY         Social History:  Social History     Socioeconomic History     Marital status:      Spouse name: Not on file     Number of children: Not on file     Years of education: Not on file     Highest education level: Not on file   Occupational History     Not on file   Tobacco Use     Smoking status: Former Smoker     Packs/day: 0.00     Quit date: 2021     Years since quittin.6     Smokeless tobacco: Never Used   Substance and Sexual Activity     Alcohol use: Yes     Drug use: No     Sexual activity: Yes     Partners: Male     Birth control/protection: Male Surgical   Other Topics Concern     Parent/sibling w/ CABG, MI or angioplasty before 65F 55M? Not Asked   Social History Narrative     Not on file     Social Determinants of Health     Financial Resource Strain:      Difficulty of Paying Living Expenses:    Food Insecurity:      Worried About Running Out of Food in the Last Year:      Ran Out of Food in the Last Year:    Transportation Needs:      Lack of Transportation (Medical):      Lack of Transportation (Non-Medical):    Physical Activity:      Days of Exercise per Week:      Minutes of Exercise per Session:    Stress:      Feeling of Stress :    Social Connections:      Frequency of Communication with Friends and Family:      Frequency of Social Gatherings with Friends and Family:      Attends Sabianism Services:      Active Member of Clubs or Organizations:      Attends Club or Organization Meetings:      Marital Status:    Intimate Partner Violence:      Fear of Current or Ex-Partner:      Emotionally Abused:      Physically Abused:      Sexually Abused:        Family History:  Family History   Problem Relation Age of Onset     Diabetes Mother      Chronic Obstructive Pulmonary Disease Mother      Parkinsonism Mother         ramona      Glaucoma No family hx of      Macular Degeneration No family hx of            Impression/Plan:  Referred per cardiology due to htn. Significant hypersomnia, occasional episodes of possible sleep paralysis,  Cataplexy, and hypnogogic/hypnopompic hallucinations.She is also a snorer. Frequent episodes of falling asleep driving.   Literature provided regarding sleep apnea and narcolepsy.      She will follow up with me in approximately two weeks after her sleep study has been competed to review the results and discuss plan of care.       Polysomnography reviewed.  Obstructive sleep apnea reviewed.  Complications of untreated sleep apnea were reviewed.        CC: Bessie Parker

## 2021-08-21 DIAGNOSIS — F31.81 BIPOLAR 2 DISORDER (H): ICD-10-CM

## 2021-08-23 NOTE — TELEPHONE ENCOUNTER
Fluoxetine refill request  Last OV Dr. Corrigan: 2/25/20  Has been seen urgent care and other specialty depts since then.  No pending appts.  Routing refill request to provider for review/approval because:  Patient needs to be seen because it has been more than 1 year since last office visit by primary care for depression

## 2021-08-24 RX ORDER — FLUOXETINE 40 MG/1
CAPSULE ORAL
Qty: 90 CAPSULE | Refills: 0 | Status: SHIPPED | OUTPATIENT
Start: 2021-08-24 | End: 2022-04-13

## 2021-09-10 ENCOUNTER — TELEPHONE (OUTPATIENT)
Dept: SLEEP MEDICINE | Facility: CLINIC | Age: 46
End: 2021-09-10

## 2021-09-26 ENCOUNTER — HEALTH MAINTENANCE LETTER (OUTPATIENT)
Age: 46
End: 2021-09-26

## 2022-01-15 ENCOUNTER — HEALTH MAINTENANCE LETTER (OUTPATIENT)
Age: 47
End: 2022-01-15

## 2022-03-12 ENCOUNTER — HEALTH MAINTENANCE LETTER (OUTPATIENT)
Age: 47
End: 2022-03-12

## 2022-04-13 DIAGNOSIS — F31.81 BIPOLAR 2 DISORDER (H): ICD-10-CM

## 2022-04-13 RX ORDER — FLUOXETINE 40 MG/1
40 CAPSULE ORAL DAILY
Qty: 90 CAPSULE | Refills: 0 | Status: SHIPPED | OUTPATIENT
Start: 2022-04-13 | End: 2023-11-28

## 2022-04-13 NOTE — TELEPHONE ENCOUNTER
"Routing refill request to provider for review/approval because:  This has not been filled since 8/2021.  Please advise Thank you. Barbara Lizarraga R.N.    Requested Prescriptions   Pending Prescriptions Disp Refills    FLUoxetine (PROZAC) 40 MG capsule [Pharmacy Med Name: FLUOXETINE 40MG CAPSULES] 90 capsule 0     Sig: TAKE 1 CAPSULE(40 MG) BY MOUTH DAILY        SSRIs Protocol Failed - 4/13/2022  7:08 AM        Failed - Recent (12 mo) or future (30 days) visit within the authorizing provider's specialty     Patient has had an office visit with the authorizing provider or a provider within the authorizing providers department within the previous 12 mos or has a future within next 30 days. See \"Patient Info\" tab in inbasket, or \"Choose Columns\" in Meds & Orders section of the refill encounter.              Passed - Medication is active on med list        Passed - Patient is age 18 or older        Passed - No active pregnancy on record        Passed - No positive pregnancy test in last 12 months                  "

## 2022-05-03 ENCOUNTER — TELEPHONE (OUTPATIENT)
Dept: FAMILY MEDICINE | Facility: CLINIC | Age: 47
End: 2022-05-03
Payer: COMMERCIAL

## 2022-05-03 NOTE — TELEPHONE ENCOUNTER
Patient Quality Outreach    Patient is due for the following:   Asthma  -  ACT needed  Hypertension -  Hypertension follow-up visit  Colon Cancer Screening -  Colonoscopy  Breast Cancer Screening - Mammogram  Cervical Cancer Screening - PAP Needed  Depression  -  PHQ-9 Needed  Physical  - due now  Immunizations  -  Covid and TD    NEXT STEPS:   Schedule a yearly physical    Type of outreach:    Sent [employer] message.      Questions for provider review:    None     Tata Velasquez MA

## 2023-04-23 ENCOUNTER — HEALTH MAINTENANCE LETTER (OUTPATIENT)
Age: 48
End: 2023-04-23

## 2023-11-14 ENCOUNTER — OFFICE VISIT (OUTPATIENT)
Dept: PODIATRY | Facility: CLINIC | Age: 48
End: 2023-11-14
Payer: COMMERCIAL

## 2023-11-14 ENCOUNTER — ANCILLARY PROCEDURE (OUTPATIENT)
Dept: GENERAL RADIOLOGY | Facility: CLINIC | Age: 48
End: 2023-11-14
Attending: PODIATRIST
Payer: COMMERCIAL

## 2023-11-14 VITALS
DIASTOLIC BLOOD PRESSURE: 82 MMHG | TEMPERATURE: 98.8 F | BODY MASS INDEX: 27.82 KG/M2 | SYSTOLIC BLOOD PRESSURE: 156 MMHG | WEIGHT: 138 LBS | HEIGHT: 59 IN

## 2023-11-14 DIAGNOSIS — M77.51 CAPSULITIS OF METATARSOPHALANGEAL (MTP) JOINT OF RIGHT FOOT: ICD-10-CM

## 2023-11-14 DIAGNOSIS — M20.10 HALLUX VALGUS: ICD-10-CM

## 2023-11-14 DIAGNOSIS — M20.11 HALLUX VALGUS (ACQUIRED), RIGHT FOOT: ICD-10-CM

## 2023-11-14 DIAGNOSIS — Q66.6 PES VALGUS OF RIGHT FOOT: Primary | ICD-10-CM

## 2023-11-14 PROCEDURE — 99203 OFFICE O/P NEW LOW 30 MIN: CPT | Performed by: PODIATRIST

## 2023-11-14 PROCEDURE — 73630 X-RAY EXAM OF FOOT: CPT | Mod: TC | Performed by: RADIOLOGY

## 2023-11-14 ASSESSMENT — PAIN SCALES - GENERAL: PAINLEVEL: MODERATE PAIN (5)

## 2023-11-14 NOTE — LETTER
11/14/2023         RE: Inocente Gutierrez  [address] [facility] [address]        Dear Colleague,    Thank you for referring your patient, Inocente Gutierrez, to the [facility]. Please see a copy of my visit note below.    HPI:  Inocente Gutierrez is a 48 year old female who is seen in consultation at the request of self.    Pt presents for eval of:   (Onset, Location, L/R, Character, Treatments, Injury if yes)    XR Left and Right foot 11/14/2023  XR Right foot 5/22/2017     Ongoing plantar Right > Left ball of foot pain. Numbness plantar Left arch. Right > Left medial bunion pain. Right 2nd toe pain with direct pressure to toe.   Constant swelling. Dull ache pain 5/10.    OTC inserts, ball of foot and bunion padding, minimal barefoot walking    Works as a , [employer], mostly seated work.      ROS:  10 point ROS neg other than the symptoms noted above in the HPI.    Patient Active Problem List   Diagnosis     Mild episode of recurrent major depressive disorder (H24)     Pain in both wrists     Family history of Nabil-Parkinson-White (WPW) syndrome     Elevated blood pressure reading without diagnosis of hypertension     Asthma       PAST MEDICAL HISTORY:   Past Medical History:   Diagnosis Date     Depressive disorder      Hypertension      Uncomplicated asthma         PAST SURGICAL HISTORY:   Past Surgical History:   Procedure Laterality Date     APPENDECTOMY       LEEP TX, CERVICAL  2003    result unknown     TONSILLECTOMY          MEDICATIONS:   Current Outpatient Medications:      albuterol (PROAIR HFA/PROVENTIL HFA/VENTOLIN HFA) 108 (90 Base) MCG/ACT inhaler, Inhale 2 puffs into the lungs every 4 hours as needed for shortness of breath / dyspnea or wheezing, Disp: 1 Inhaler, Rfl: 3     albuterol (PROVENTIL) (2.5 MG/3ML) 0.083% neb solution, Take 1 vial (2.5 mg) by nebulization every 6 hours as needed for shortness of breath / dyspnea or wheezing, Disp: 1  "Box, Rfl: 0     amLODIPine (NORVASC) 5 MG tablet, Take 1 tablet (5 mg) by mouth daily, Disp: 60 tablet, Rfl: 1     FLUoxetine (PROZAC) 40 MG capsule, Take 1 capsule (40 mg) by mouth daily Needs to be seen for further refills (Patient not taking: Reported on 2023), Disp: 90 capsule, Rfl: 0     ALLERGIES:  No Known Allergies     SOCIAL HISTORY:   Social History     Socioeconomic History     Marital status:      Spouse name: Not on file     Number of children: Not on file     Years of education: Not on file     Highest education level: Not on file   Occupational History     Not on file   Tobacco Use     Smoking status: Former     Packs/day: 0     Types: Cigarettes     Quit date: 2021     Years since quittin.8     Smokeless tobacco: Never   Substance and Sexual Activity     Alcohol use: Yes     Drug use: No     Sexual activity: Yes     Partners: Male     Birth control/protection: Male Surgical   Other Topics Concern     Parent/sibling w/ CABG, MI or angioplasty before 65F 55M? Not Asked   Social History Narrative     Not on file     Social Determinants of Health     Financial Resource Strain: Not on file   Food Insecurity: Not on file   Transportation Needs: Not on file   Physical Activity: Not on file   Stress: Not on file   Social Connections: Not on file   Interpersonal Safety: Not on file   Housing Stability: Not on file        FAMILY HISTORY:   Family History   Problem Relation Age of Onset     Diabetes Mother      Chronic Obstructive Pulmonary Disease Mother      Parkinsonism Mother         greene     Glaucoma No family hx of      Macular Degeneration No family hx of         EXAM:Vitals: BP (!) 156/82   Temp 98.8  F (37.1  C) (Temporal)   Ht 1.486 m (4' 10.5\")   Wt 62.6 kg (138 lb)   BMI 28.35 kg/m    BMI= Body mass index is 28.35 kg/m .    General appearance: Patient is alert and fully cooperative with history & exam.  No sign of distress is noted during the visit.     Psychiatric: Affect " is pleasant & appropriate.  Patient appears motivated to improve health.     Respiratory: Breathing is regular & unlabored while sitting.     HEENT: Hearing is intact to spoken word.  Speech is clear.  No gross evidence of visual impairment that would impact ambulation.     Vascular: DP & PT pulses are intact & regular bilaterally.  No significant edema or varicosities noted.  CFT and skin temperature is normal to both lower extremities.     Neurologic: Lower extremity sensation is intact to light touch.  No evidence of weakness or contracture in the lower extremities.  No evidence of neuropathy.    Dermatologic: Skin is intact to both lower extremities with adequate texture, turgor and tone about the integument.  No paronychia or evidence of soft tissue infection is noted.       Musculoskeletal: Patient is ambulatory without assistive device or brace.  Generalized pes valgus much more on the right than the left.  Also hallux valgus with lateral deviation of the hallux against the second digit and prominence about the medial first metatarsal head lateral fifth metatarsal head on the right foot.  This is partially reducible today no tracking is noted.  Minimal symptoms about the first ray and fifth ray mostly symptoms about palpation of the second metatarsal phalangeal joint.  No palpable edema.  There is obvious hyperkeratosis about the plantar second metatarsal head and this is where her pain is through the range of motion of the right second MPJ.  No edema erythema or heat about this.  No painful or limited range of motion through the ankle subtalar midtarsal joints however.  Manual muscle strength is 5/5 to all 4 quadrants including plantarflexion of the second toe without symptoms.    Radiographs: 3 views bilateral 11/14/2023 demonstrate elevated intermetatarsal angle HAV angle with pes valgus and hallux valgus on the right foot.  No medial or lateral deviation of the second digit.  Abnormally long second  metatarsal with abnormal metatarsal length parabola.  No acute cortical reaction or fracture.  No flattening of the second metatarsal head no sclerotic changes of the second metatarsal head where her symptoms are     ASSESSMENT:       ICD-10-CM    1. Pes valgus of right foot  Q66.6 Orthotics and Prosthetics DME Orthotic; Foot Orthotics; Bilateral      2. Hallux valgus (acquired), right foot  M20.11       3. Capsulitis of metatarsophalangeal (MTP) joint of right foot  M77.51            PLAN:  Reviewed patient's chart in [facility].      11/14/2023   Obtained and interpreted radiographs  Recommend custom molded orthotics and appropriate shoe gear.    Discussed oral anti-inflammatories which she would rather not do  We discussed injections padding and splinting  We also discussed surgically correcting her forefoot valgus to improve weightbearing load throughout the forefoot but she is not interested in surgical treatments at this time noting that her symptoms are tolerable and she would like to exhaust conservative options first.    Follow-up with me in 5 or 6 weeks or after utilizing the orthotics for some time to see how she is progressing with this.      Tyrese Winslow DPM      Again, thank you for allowing me to participate in the care of your patient.        Sincerely,        Tyrese Winslow DPM

## 2023-11-14 NOTE — PROGRESS NOTES
HPI:  Inocente Gutierrez is a 48 year old female who is seen in consultation at the request of self.    Pt presents for eval of:   (Onset, Location, L/R, Character, Treatments, Injury if yes)    XR Left and Right foot 11/14/2023  XR Right foot 5/22/2017     Ongoing plantar Right > Left ball of foot pain. Numbness plantar Left arch. Right > Left medial bunion pain. Right 2nd toe pain with direct pressure to toe.   Constant swelling. Dull ache pain 5/10.    OTC inserts, ball of foot and bunion padding, minimal barefoot walking    Works as a , [employer], mostly seated work.      ROS:  10 point ROS neg other than the symptoms noted above in the HPI.    Patient Active Problem List   Diagnosis    Mild episode of recurrent major depressive disorder (H24)    Pain in both wrists    Family history of Nabil-Parkinson-White (WPW) syndrome    Elevated blood pressure reading without diagnosis of hypertension    Asthma       PAST MEDICAL HISTORY:   Past Medical History:   Diagnosis Date    Depressive disorder     Hypertension     Uncomplicated asthma         PAST SURGICAL HISTORY:   Past Surgical History:   Procedure Laterality Date    APPENDECTOMY      LEEP TX, CERVICAL  2003    result unknown    TONSILLECTOMY          MEDICATIONS:   Current Outpatient Medications:     albuterol (PROAIR HFA/PROVENTIL HFA/VENTOLIN HFA) 108 (90 Base) MCG/ACT inhaler, Inhale 2 puffs into the lungs every 4 hours as needed for shortness of breath / dyspnea or wheezing, Disp: 1 Inhaler, Rfl: 3    albuterol (PROVENTIL) (2.5 MG/3ML) 0.083% neb solution, Take 1 vial (2.5 mg) by nebulization every 6 hours as needed for shortness of breath / dyspnea or wheezing, Disp: 1 Box, Rfl: 0    amLODIPine (NORVASC) 5 MG tablet, Take 1 tablet (5 mg) by mouth daily, Disp: 60 tablet, Rfl: 1    FLUoxetine (PROZAC) 40 MG capsule, Take 1 capsule (40 mg) by mouth daily Needs to be seen for further refills (Patient not taking: Reported on 11/14/2023),  "Disp: 90 capsule, Rfl: 0     ALLERGIES:  No Known Allergies     SOCIAL HISTORY:   Social History     Socioeconomic History    Marital status:      Spouse name: Not on file    Number of children: Not on file    Years of education: Not on file    Highest education level: Not on file   Occupational History    Not on file   Tobacco Use    Smoking status: Former     Packs/day: 0     Types: Cigarettes     Quit date: 2021     Years since quittin.8    Smokeless tobacco: Never   Substance and Sexual Activity    Alcohol use: Yes    Drug use: No    Sexual activity: Yes     Partners: Male     Birth control/protection: Male Surgical   Other Topics Concern    Parent/sibling w/ CABG, MI or angioplasty before 65F 55M? Not Asked   Social History Narrative    Not on file     Social Determinants of Health     Financial Resource Strain: Not on file   Food Insecurity: Not on file   Transportation Needs: Not on file   Physical Activity: Not on file   Stress: Not on file   Social Connections: Not on file   Interpersonal Safety: Not on file   Housing Stability: Not on file        FAMILY HISTORY:   Family History   Problem Relation Age of Onset    Diabetes Mother     Chronic Obstructive Pulmonary Disease Mother     Parkinsonism Mother         greene    Glaucoma No family hx of     Macular Degeneration No family hx of         EXAM:Vitals: BP (!) 156/82   Temp 98.8  F (37.1  C) (Temporal)   Ht 1.486 m (4' 10.5\")   Wt 62.6 kg (138 lb)   BMI 28.35 kg/m    BMI= Body mass index is 28.35 kg/m .    General appearance: Patient is alert and fully cooperative with history & exam.  No sign of distress is noted during the visit.     Psychiatric: Affect is pleasant & appropriate.  Patient appears motivated to improve health.     Respiratory: Breathing is regular & unlabored while sitting.     HEENT: Hearing is intact to spoken word.  Speech is clear.  No gross evidence of visual impairment that would impact ambulation.     Vascular: DP & " PT pulses are intact & regular bilaterally.  No significant edema or varicosities noted.  CFT and skin temperature is normal to both lower extremities.     Neurologic: Lower extremity sensation is intact to light touch.  No evidence of weakness or contracture in the lower extremities.  No evidence of neuropathy.    Dermatologic: Skin is intact to both lower extremities with adequate texture, turgor and tone about the integument.  No paronychia or evidence of soft tissue infection is noted.       Musculoskeletal: Patient is ambulatory without assistive device or brace.  Generalized pes valgus much more on the right than the left.  Also hallux valgus with lateral deviation of the hallux against the second digit and prominence about the medial first metatarsal head lateral fifth metatarsal head on the right foot.  This is partially reducible today no tracking is noted.  Minimal symptoms about the first ray and fifth ray mostly symptoms about palpation of the second metatarsal phalangeal joint.  No palpable edema.  There is obvious hyperkeratosis about the plantar second metatarsal head and this is where her pain is through the range of motion of the right second MPJ.  No edema erythema or heat about this.  No painful or limited range of motion through the ankle subtalar midtarsal joints however.  Manual muscle strength is 5/5 to all 4 quadrants including plantarflexion of the second toe without symptoms.    Radiographs: 3 views bilateral 11/14/2023 demonstrate elevated intermetatarsal angle HAV angle with pes valgus and hallux valgus on the right foot.  No medial or lateral deviation of the second digit.  Abnormally long second metatarsal with abnormal metatarsal length parabola.  No acute cortical reaction or fracture.  No flattening of the second metatarsal head no sclerotic changes of the second metatarsal head where her symptoms are     ASSESSMENT:       ICD-10-CM    1. Pes valgus of right foot  Q66.6 Orthotics and  Prosthetics DME Orthotic; Foot Orthotics; Bilateral      2. Hallux valgus (acquired), right foot  M20.11       3. Capsulitis of metatarsophalangeal (MTP) joint of right foot  M77.51            PLAN:  [facility]      11/14/2023   Obtained and interpreted radiographs  Recommend custom molded orthotics and appropriate shoe gear.    Discussed oral anti-inflammatories which she would rather not do  We discussed injections padding and splinting  We also discussed surgically correcting her forefoot valgus to improve weightbearing load throughout the forefoot but she is not interested in surgical treatments at this time noting that her symptoms are tolerable and she would like to exhaust conservative options first.    Follow-up with me in 5 or 6 weeks or after utilizing the orthotics for some time to see how she is progressing with this.      Tyrese Winslow DPM

## 2023-11-14 NOTE — PATIENT INSTRUCTIONS
Reliable shoe stores: To maximize your experience and provide the best possible fit.  Be sure to show them your foot concerns and tell them Dr. Winslow sent you.      Stores listed in bold have only athletic shoes, and stores that are not bold are mostly casual or variety of shoes    [employer]  [address] [place] [address]  [phone]    [facility] [place]  [address] [place] [address]  [phone]     [employer] [facility]  [address] [place] [address]  [phone]    [employer]  [address] [facility] [address]  [phone]    [employer]  [address] [place], [place] [address] [phone]    [employer]  [address] [place] [address]  [phone]                         [employer] Locations:     [address] [place] [address]   [phone]     [address] [place] [address] [address] [place] [address]   [phone]     [address] [place] [address]   [phone]     [address] [facility] [place] [address]   [phone]     [address] [facility] [address] [facility] [address] [place] [address]   [phone]     [address] [place] [address]   [phone]     [address] [facility] [address]   [place] [address]   [phone]     [address] [facility]   [place] [address]   [phone]             How to find reasonable shoes          The correct width    Correct Fitting    Correct Length      Foot Distortion    Posture Distortion                          Torsional Rigidity      Grasp behind the heel and underneath the foot and twist      Bad    Excessive torsion/twist in midfoot     Less torsion/twist in midfoot is better                   Heel Counter Rigidity      Grasp just above   midsole and squeeze      Bad    Soft heel counter      Good    Rigid Heel Counter      Flexion Rigidity      Grasp shoe and bend from forefoot to rearfoot

## 2023-11-28 ENCOUNTER — LAB (OUTPATIENT)
Dept: FAMILY MEDICINE | Facility: CLINIC | Age: 48
End: 2023-11-28

## 2023-11-28 ENCOUNTER — OFFICE VISIT (OUTPATIENT)
Dept: FAMILY MEDICINE | Facility: CLINIC | Age: 48
End: 2023-11-28
Payer: COMMERCIAL

## 2023-11-28 VITALS
WEIGHT: 140 LBS | SYSTOLIC BLOOD PRESSURE: 136 MMHG | HEART RATE: 85 BPM | DIASTOLIC BLOOD PRESSURE: 80 MMHG | HEIGHT: 59 IN | RESPIRATION RATE: 16 BRPM | TEMPERATURE: 97.8 F | OXYGEN SATURATION: 100 % | BODY MASS INDEX: 28.22 KG/M2

## 2023-11-28 DIAGNOSIS — Z12.11 SCREEN FOR COLON CANCER: ICD-10-CM

## 2023-11-28 DIAGNOSIS — Z13.29 SCREENING FOR HYPOTHYROIDISM: ICD-10-CM

## 2023-11-28 DIAGNOSIS — Z12.31 VISIT FOR SCREENING MAMMOGRAM: ICD-10-CM

## 2023-11-28 DIAGNOSIS — R51.9 INTRACTABLE HEADACHE, UNSPECIFIED CHRONICITY PATTERN, UNSPECIFIED HEADACHE TYPE: ICD-10-CM

## 2023-11-28 DIAGNOSIS — Z13.220 SCREENING FOR HYPERLIPIDEMIA: ICD-10-CM

## 2023-11-28 DIAGNOSIS — J45.20 MILD INTERMITTENT ASTHMA WITHOUT COMPLICATION: ICD-10-CM

## 2023-11-28 DIAGNOSIS — F32.1 CURRENT MODERATE EPISODE OF MAJOR DEPRESSIVE DISORDER WITHOUT PRIOR EPISODE (H): ICD-10-CM

## 2023-11-28 DIAGNOSIS — F32.1 CURRENT MODERATE EPISODE OF MAJOR DEPRESSIVE DISORDER WITHOUT PRIOR EPISODE (H): Primary | ICD-10-CM

## 2023-11-28 DIAGNOSIS — Z11.4 SCREENING FOR HIV (HUMAN IMMUNODEFICIENCY VIRUS): ICD-10-CM

## 2023-11-28 DIAGNOSIS — Z12.4 CERVICAL CANCER SCREENING: ICD-10-CM

## 2023-11-28 DIAGNOSIS — Z11.59 NEED FOR HEPATITIS C SCREENING TEST: ICD-10-CM

## 2023-11-28 DIAGNOSIS — E87.5 SERUM POTASSIUM ELEVATED: ICD-10-CM

## 2023-11-28 LAB
ALBUMIN SERPL BCG-MCNC: 4.5 G/DL (ref 3.5–5.2)
ALP SERPL-CCNC: 69 U/L (ref 40–150)
ALT SERPL W P-5'-P-CCNC: 22 U/L (ref 0–50)
ANION GAP SERPL CALCULATED.3IONS-SCNC: 11 MMOL/L (ref 7–15)
AST SERPL W P-5'-P-CCNC: 21 U/L (ref 0–45)
BILIRUB SERPL-MCNC: 0.2 MG/DL
BUN SERPL-MCNC: 22.4 MG/DL (ref 6–20)
CALCIUM SERPL-MCNC: 9.5 MG/DL (ref 8.6–10)
CHLORIDE SERPL-SCNC: 102 MMOL/L (ref 98–107)
CHOLEST SERPL-MCNC: 186 MG/DL
CREAT SERPL-MCNC: 0.78 MG/DL (ref 0.51–0.95)
DEPRECATED HCO3 PLAS-SCNC: 28 MMOL/L (ref 22–29)
EGFRCR SERPLBLD CKD-EPI 2021: >90 ML/MIN/1.73M2
ERYTHROCYTE [DISTWIDTH] IN BLOOD BY AUTOMATED COUNT: 12.4 % (ref 10–15)
GLUCOSE SERPL-MCNC: 103 MG/DL (ref 70–99)
HCT VFR BLD AUTO: 41.7 % (ref 35–47)
HCV AB SERPL QL IA: NONREACTIVE
HDLC SERPL-MCNC: 88 MG/DL
HGB BLD-MCNC: 13.4 G/DL (ref 11.7–15.7)
HIV 1+2 AB+HIV1 P24 AG SERPL QL IA: NONREACTIVE
LDLC SERPL CALC-MCNC: 86 MG/DL
MCH RBC QN AUTO: 29.9 PG (ref 26.5–33)
MCHC RBC AUTO-ENTMCNC: 32.1 G/DL (ref 31.5–36.5)
MCV RBC AUTO: 93 FL (ref 78–100)
NONHDLC SERPL-MCNC: 98 MG/DL
PLATELET # BLD AUTO: 346 10E3/UL (ref 150–450)
POTASSIUM SERPL-SCNC: 5.9 MMOL/L (ref 3.4–5.3)
PROT SERPL-MCNC: 7 G/DL (ref 6.4–8.3)
RBC # BLD AUTO: 4.48 10E6/UL (ref 3.8–5.2)
SODIUM SERPL-SCNC: 141 MMOL/L (ref 135–145)
TRIGL SERPL-MCNC: 61 MG/DL
TSH SERPL DL<=0.005 MIU/L-ACNC: 1.28 UIU/ML (ref 0.3–4.2)
WBC # BLD AUTO: 5.3 10E3/UL (ref 4–11)

## 2023-11-28 PROCEDURE — 80061 LIPID PANEL: CPT | Performed by: NURSE PRACTITIONER

## 2023-11-28 PROCEDURE — 86803 HEPATITIS C AB TEST: CPT | Performed by: NURSE PRACTITIONER

## 2023-11-28 PROCEDURE — 87389 HIV-1 AG W/HIV-1&-2 AB AG IA: CPT | Performed by: NURSE PRACTITIONER

## 2023-11-28 PROCEDURE — 84443 ASSAY THYROID STIM HORMONE: CPT | Performed by: NURSE PRACTITIONER

## 2023-11-28 PROCEDURE — 85027 COMPLETE CBC AUTOMATED: CPT | Performed by: NURSE PRACTITIONER

## 2023-11-28 PROCEDURE — 80053 COMPREHEN METABOLIC PANEL: CPT | Performed by: NURSE PRACTITIONER

## 2023-11-28 PROCEDURE — 99215 OFFICE O/P EST HI 40 MIN: CPT | Performed by: NURSE PRACTITIONER

## 2023-11-28 PROCEDURE — 36415 COLL VENOUS BLD VENIPUNCTURE: CPT | Performed by: NURSE PRACTITIONER

## 2023-11-28 RX ORDER — ALBUTEROL SULFATE 90 UG/1
2 AEROSOL, METERED RESPIRATORY (INHALATION) EVERY 4 HOURS PRN
Qty: 8.5 G | Refills: 3 | Status: SHIPPED | OUTPATIENT
Start: 2023-11-28

## 2023-11-28 RX ORDER — FLUTICASONE PROPIONATE AND SALMETEROL 250; 50 UG/1; UG/1
1 POWDER RESPIRATORY (INHALATION) EVERY 12 HOURS
Qty: 1 EACH | Refills: 3 | Status: SHIPPED | OUTPATIENT
Start: 2023-11-28

## 2023-11-28 RX ORDER — ALBUTEROL SULFATE 0.83 MG/ML
2.5 SOLUTION RESPIRATORY (INHALATION) EVERY 6 HOURS PRN
Qty: 90 ML | Refills: 1 | Status: SHIPPED | OUTPATIENT
Start: 2023-11-28

## 2023-11-28 ASSESSMENT — ANXIETY QUESTIONNAIRES
1. FEELING NERVOUS, ANXIOUS, OR ON EDGE: NEARLY EVERY DAY
2. NOT BEING ABLE TO STOP OR CONTROL WORRYING: NOT AT ALL
4. TROUBLE RELAXING: SEVERAL DAYS
6. BECOMING EASILY ANNOYED OR IRRITABLE: NEARLY EVERY DAY
GAD7 TOTAL SCORE: 8
IF YOU CHECKED OFF ANY PROBLEMS ON THIS QUESTIONNAIRE, HOW DIFFICULT HAVE THESE PROBLEMS MADE IT FOR YOU TO DO YOUR WORK, TAKE CARE OF THINGS AT HOME, OR GET ALONG WITH OTHER PEOPLE: VERY DIFFICULT
5. BEING SO RESTLESS THAT IT IS HARD TO SIT STILL: SEVERAL DAYS
7. FEELING AFRAID AS IF SOMETHING AWFUL MIGHT HAPPEN: NOT AT ALL
3. WORRYING TOO MUCH ABOUT DIFFERENT THINGS: NOT AT ALL
GAD7 TOTAL SCORE: 8

## 2023-11-28 ASSESSMENT — PATIENT HEALTH QUESTIONNAIRE - PHQ9
SUM OF ALL RESPONSES TO PHQ QUESTIONS 1-9: 25
SUM OF ALL RESPONSES TO PHQ QUESTIONS 1-9: 25

## 2023-11-28 ASSESSMENT — ASTHMA QUESTIONNAIRES: ACT_TOTALSCORE: 12

## 2023-11-28 ASSESSMENT — PAIN SCALES - GENERAL: PAINLEVEL: NO PAIN (0)

## 2023-11-28 ASSESSMENT — ENCOUNTER SYMPTOMS: HEADACHES: 1

## 2023-11-28 NOTE — PROGRESS NOTES
Assessment & Plan     Current moderate episode of major depressive disorder without prior episode (H)  Uncontrolled patient has not been taking her Prozac would like to restart her Prozac patient also has had some family stressors marital stressors and job stressors reviewed at length would recommend patient restart counseling as well.  Will have patient follow-up in 1 month after starting Prozac and referral placed for counseling  - FLUoxetine (PROZAC) 20 MG capsule; Take 1 capsule (20 mg) by mouth daily for 7 days, THEN 2 capsules (40 mg) daily for 23 days.  - Adult Mental Health  Referral; Future      Mild intermittent asthma without complication  Uncontrolled patient has not had a long-acting inhaler will start Advair reviewed use of a long-acting inhaler  - albuterol (PROAIR HFA/PROVENTIL HFA/VENTOLIN HFA) 108 (90 Base) MCG/ACT inhaler; Inhale 2 puffs into the lungs every 4 hours as needed for shortness of breath or wheezing  - albuterol (PROVENTIL) (2.5 MG/3ML) 0.083% neb solution; Take 1 vial (2.5 mg) by nebulization every 6 hours as needed for shortness of breath or wheezing  - fluticasone-salmeterol (ADVAIR) 250-50 MCG/ACT inhaler; Inhale 1 puff into the lungs every 12 hours    Intractable headache, unspecified chronicity pattern, unspecified headache type  Patient has had intermittent headaches does not appear malignant in nature we will attempt amitriptyline and if not improving after amitriptyline patient will follow-up and we will consider referral to neurology  - amitriptyline (ELAVIL) 25 MG tablet; Take 1 tablet (25 mg) by mouth at bedtime  - Comprehensive metabolic panel (BMP + Alb, Alk Phos, ALT, AST, Total. Bili, TP); Future  - CBC with platelets; Future  - CBC with platelets  - Comprehensive metabolic panel (BMP + Alb, Alk Phos, ALT, AST, Total. Bili, TP)    Screen for colon cancer  - COLOGUARD(EXACT SCIENCES); Future    Screening for HIV (human immunodeficiency virus)  - HIV Antigen  "Antibody Combo; Future  - HIV Antigen Antibody Combo    Need for hepatitis C screening test  - Hepatitis C Screen Reflex to HCV RNA Quant and Genotype; Future  - Hepatitis C Screen Reflex to HCV RNA Quant and Genotype    Visit for screening mammogram  - MA SCREENING DIGITAL BILAT - Future  (s+30); Future    Cervical cancer screening  Patient declined     Screening for hyperlipidemia  - Lipid panel reflex to direct LDL Non-fasting; Future  - Lipid panel reflex to direct LDL Non-fasting    Screening for hypothyroidism  - TSH with free T4 reflex; Future  - TSH with free T4 reflex    40 minutes spent by me on the date of the encounter doing chart review, review of outside records, review of test results, interpretation of tests, patient visit, and documentation        BMI:   Estimated body mass index is 28.76 kg/m  as calculated from the following:    Height as of this encounter: 1.486 m (4' 10.5\").    Weight as of this encounter: 63.5 kg (140 lb).   Weight management plan: Discussed healthy diet and exercise guidelines    Depression Screening Follow Up        11/28/2023     7:14 AM   PHQ   PHQ-9 Total Score 25   Q9: Thoughts of better off dead/self-harm past 2 weeks Nearly every day   F/U: Thoughts of suicide or self-harm Yes   F/U: Self harm-plan Yes   F/U: Self-harm action No   F/U: Safety concerns No         11/28/2023     7:14 AM   Last PHQ-9   1.  Little interest or pleasure in doing things 3   2.  Feeling down, depressed, or hopeless 3   3.  Trouble falling or staying asleep, or sleeping too much 3   4.  Feeling tired or having little energy 3   5.  Poor appetite or overeating 3   6.  Feeling bad about yourself 3   7.  Trouble concentrating 3   8.  Moving slowly or restless 1   Q9: Thoughts of better off dead/self-harm past 2 weeks 3   PHQ-9 Total Score 25   In the past two weeks have you had thoughts of suicide or self harm? Yes   Do you have concerns about your personal safety or the safety of others? No   In " the past 2 weeks have you thought about a plan or had intention to harm yourself? Yes   In the past 2 weeks have you acted on these thoughts in any way? No               Follow Up      Follow Up Actions Taken  Crisis resource information provided in the After Visit Summary    Discussed the following ways the patient can remain in a safe environment:  remove alcohol, remove drugs, and be around others  See Patient Instructions    RENAN Avila CNP  [facility]    Mary Brower is a 48 year old, presenting for the following health issues:  Asthma, Mental Health Problem, and Headache      11/28/2023     7:16 AM   Additional Questions   Roomed by Eli MYERS       Mental Health Problem  Associated symptoms include headaches.   Headache     History of Present Illness     Asthma:  She presents for follow up of asthma.  She has no cough, some wheezing, and some shortness of breath.  She is using a relief medication daily. She does not miss any doses of her controller medication throughout the week. Patient is aware of the following triggers: unaware of any triggers. The patient has not had a visit to the Emergency Room, Urgent Care or Hospital due to asthma since the last clinic visit.     Mental Health Follow-up:  Patient presents to follow-up on Depression & Anxiety.Patient's depression since last visit has been:  Worse  The patient is not having other symptoms associated with depression.  Patient's anxiety since last visit has been:  Worse  The patient is not having other symptoms associated with anxiety.  Any significant life events: relationship concerns, job concerns, financial concerns, housing concerns and health concerns  Patient is feeling anxious or having panic attacks.  Patient has no concerns about alcohol or drug use.    Headaches:   Since the patient's last clinic visit, headaches are: worsened  The patient is getting headaches:  3 to 4 times a week  She is able to do  "normal daily activities when she has a migraine.  The patient is taking the following rescue/relief medications:  Excedrin   Patient states \"I get some relief\" from the rescue/relief medications.   The patient is taking the following medications to prevent migraines:  No medications to prevent migraines  In the past 4 weeks, the patient has gone to an Urgent Care or Emergency Room 0 times times due to headaches.    She eats 2-3 servings of fruits and vegetables daily.She consumes 2 sweetened beverage(s) daily.She exercises with enough effort to increase her heart rate 30 to 60 minutes per day.  She exercises with enough effort to increase her heart rate 4 days per week. She is missing 7 dose(s) of medications per week.       Patient is not suicidal         Review of Systems   Neurological:  Positive for headaches.      Constitutional, HEENT, cardiovascular, pulmonary, gi and gu systems are negative, except as otherwise noted.      Objective    /80 (BP Location: Right arm, Cuff Size: Adult Regular)   Pulse 85   Temp 97.8  F (36.6  C) (Tympanic)   Resp 16   Ht 1.486 m (4' 10.5\")   Wt 63.5 kg (140 lb)   SpO2 100%   BMI 28.76 kg/m    Body mass index is 28.76 kg/m .  Physical Exam   GENERAL: healthy, alert and no distress  EYES: Eyes grossly normal to inspection, PERRL and conjunctivae and sclerae normal  HENT: ear canals and TM's normal, nose and mouth without ulcers or lesions  NECK: no adenopathy, no asymmetry, masses, or scars and thyroid normal to palpation  RESP: lungs clear to auscultation - no rales, rhonchi or wheezes  CV: regular rate and rhythm, normal S1 S2, no S3 or S4, no murmur, click or rub, no peripheral edema and peripheral pulses strong  ABDOMEN: soft, nontender, no hepatosplenomegaly, no masses and bowel sounds normal  MS: no gross musculoskeletal defects noted, no edema  SKIN: no suspicious lesions or rashes  NEURO: Normal strength and tone, mentation intact and speech normal  PSYCH: " mentation appears normal, affect normal/bright    Results for orders placed or performed in visit on 11/28/23   TSH with free T4 reflex     Status: Normal   Result Value Ref Range    TSH 1.28 0.30 - 4.20 uIU/mL   CBC with platelets     Status: Normal   Result Value Ref Range    WBC Count 5.3 4.0 - 11.0 10e3/uL    RBC Count 4.48 3.80 - 5.20 10e6/uL    Hemoglobin 13.4 11.7 - 15.7 g/dL    Hematocrit 41.7 35.0 - 47.0 %    MCV 93 78 - 100 fL    MCH 29.9 26.5 - 33.0 pg    MCHC 32.1 31.5 - 36.5 g/dL    RDW 12.4 10.0 - 15.0 %    Platelet Count 346 150 - 450 10e3/uL   Comprehensive metabolic panel (BMP + Alb, Alk Phos, ALT, AST, Total. Bili, TP)     Status: Abnormal   Result Value Ref Range    Sodium 141 135 - 145 mmol/L    Potassium 5.9 (H) 3.4 - 5.3 mmol/L    Carbon Dioxide (CO2) 28 22 - 29 mmol/L    Anion Gap 11 7 - 15 mmol/L    Urea Nitrogen 22.4 (H) 6.0 - 20.0 mg/dL    Creatinine 0.78 0.51 - 0.95 mg/dL    GFR Estimate >90 >60 mL/min/1.73m2    Calcium 9.5 8.6 - 10.0 mg/dL    Chloride 102 98 - 107 mmol/L    Glucose 103 (H) 70 - 99 mg/dL    Alkaline Phosphatase 69 40 - 150 U/L    AST 21 0 - 45 U/L    ALT 22 0 - 50 U/L    Protein Total 7.0 6.4 - 8.3 g/dL    Albumin 4.5 3.5 - 5.2 g/dL    Bilirubin Total 0.2 <=1.2 mg/dL   Lipid panel reflex to direct LDL Non-fasting     Status: Normal   Result Value Ref Range    Cholesterol 186 <200 mg/dL    Triglycerides 61 <150 mg/dL    Direct Measure HDL 88 >=50 mg/dL    LDL Cholesterol Calculated 86 <=100 mg/dL    Non HDL Cholesterol 98 <130 mg/dL    Narrative    Cholesterol  Desirable:  <200 mg/dL    Triglycerides  Normal:  Less than 150 mg/dL  Borderline High:  150-199 mg/dL  High:  200-499 mg/dL  Very High:  Greater than or equal to 500 mg/dL    Direct Measure HDL  Female:  Greater than or equal to 50 mg/dL   Male:  Greater than or equal to 40 mg/dL    LDL Cholesterol  Desirable:  <100mg/dL  Above Desirable:  100-129 mg/dL   Borderline High:  130-159 mg/dL   High:  160-189 mg/dL   Very  High:  >= 190 mg/dL    Non HDL Cholesterol  Desirable:  130 mg/dL  Above Desirable:  130-159 mg/dL  Borderline High:  160-189 mg/dL  High:  190-219 mg/dL  Very High:  Greater than or equal to 220 mg/dL   Hepatitis C Screen Reflex to HCV RNA Quant and Genotype     Status: Normal   Result Value Ref Range    Hepatitis C Antibody Nonreactive Nonreactive    Narrative    Assay performance characteristics have not been established for newborns, infants, and children.   HIV Antigen Antibody Combo     Status: Normal   Result Value Ref Range    HIV Antigen Antibody Combo Nonreactive Nonreactive

## 2023-11-30 ENCOUNTER — MYC MEDICAL ADVICE (OUTPATIENT)
Dept: FAMILY MEDICINE | Facility: CLINIC | Age: 48
End: 2023-11-30
Payer: COMMERCIAL

## 2023-12-01 ENCOUNTER — TELEPHONE (OUTPATIENT)
Dept: FAMILY MEDICINE | Facility: CLINIC | Age: 48
End: 2023-12-01
Payer: COMMERCIAL

## 2023-12-01 DIAGNOSIS — R51.9 INTRACTABLE HEADACHE, UNSPECIFIED CHRONICITY PATTERN, UNSPECIFIED HEADACHE TYPE: Primary | ICD-10-CM

## 2023-12-01 NOTE — TELEPHONE ENCOUNTER
[facility] Pharmacist calling to update Niyah Epperson about an drug to drug interaction between Elavil and Fluoxetine. Can have serotonergic effects causing, increased concentration of elavil. Could try Depakote, propranolol or Nurtec.  Please advise. Matilde QUEVEDO RN

## 2023-12-03 RX ORDER — PROPRANOLOL HYDROCHLORIDE 20 MG/1
TABLET ORAL
Qty: 21 TABLET | Refills: 0 | Status: SHIPPED | OUTPATIENT
Start: 2023-12-03 | End: 2023-12-04

## 2023-12-04 ENCOUNTER — TELEPHONE (OUTPATIENT)
Dept: FAMILY MEDICINE | Facility: CLINIC | Age: 48
End: 2023-12-04
Payer: COMMERCIAL

## 2023-12-04 ENCOUNTER — MYC MEDICAL ADVICE (OUTPATIENT)
Dept: FAMILY MEDICINE | Facility: CLINIC | Age: 48
End: 2023-12-04
Payer: COMMERCIAL

## 2023-12-04 NOTE — TELEPHONE ENCOUNTER
Patient Returning Call    Reason for call:  Pt has questions / concerns of recent medications that were prescribed to her that she doesn't think she needs to be on, pt will like to disclose further information to pcp and/or care team     Information relayed to patient:  N/A    Patient has additional questions:  Yes    What are your questions/concerns:  Not disclosed     Who does the patient want to speak with:  Provider or RN    Is an  needed?:  No      Could we send this information to you in [employer] or would you prefer to receive a phone call?:   Patient would prefer a phone call   Okay to leave a detailed message?: Yes at Home number on file [phone] (home)

## 2023-12-04 NOTE — TELEPHONE ENCOUNTER
Notify patient that we will need to stop the amitriptyline for the headaches and we will use propranolol to control the headaches propranolol is dosed with 20 mg daily for 7 days and then 40 mg daily I have sent in a prescription and discontinued the amitriptyline.    Niyah Epperson CNP

## 2023-12-04 NOTE — TELEPHONE ENCOUNTER
[facility] pharmacy calling. Niyah prescribed Propranolol to patient however it is contra indicated since the patient has Asthma.     Pharmacy needing to know if Niyah wants to prescribe alternative or if its ok for patient to take        Preferred Pharmacy:  [facility] Nile Johnson- [phone]

## 2023-12-04 NOTE — TELEPHONE ENCOUNTER
LM on unidentified VM to call care team.   [employer] message also sent to pt with Niyah's recommendations.  MAGED Landis RN

## 2023-12-05 NOTE — TELEPHONE ENCOUNTER
As I was leaving a message for Inocente to return our call, I see she got Niyah Epperson's message and did respond via my chart. Instructed to call back if she has any other questions.   Matilde QUEVEDO RN

## 2023-12-07 ENCOUNTER — TELEPHONE (OUTPATIENT)
Dept: FAMILY MEDICINE | Facility: CLINIC | Age: 48
End: 2023-12-07
Payer: COMMERCIAL

## 2023-12-07 NOTE — TELEPHONE ENCOUNTER
See prior note amitriptyline and propanolol were discharge    Niyah Epperson CNP     Medication Question or Refill        What medication are you calling about (include dose and sig)?: Elavil     Preferred Pharmacy:   WRITTEN PRESCRIPTION REQUESTED  No address on file    [facility]       Controlled Substance Agreement on file:   CSA -- Patient Level:    CSA: None found at the patient level.         Do you have any questions or concerns?  Yes: Fax per Pharmacy  - Drug - Drug interaction with Fluoxetine and Amitriptyline. Fluoxetine may increase concentration and effects of Amitriptyline and increase risk of serotonin syndrome. Please Advise.    [facility]

## 2023-12-14 ENCOUNTER — TELEPHONE (OUTPATIENT)
Dept: FAMILY MEDICINE | Facility: CLINIC | Age: 48
End: 2023-12-14
Payer: COMMERCIAL

## 2023-12-14 NOTE — TELEPHONE ENCOUNTER
Patient Quality Outreach    Patient is due for the following:   Colon Cancer Screening    Next Steps:   Patient needs to complete colon cancer screening.  A Cologuard was mailed to her on 11/28/23.    Type of outreach:    Phone, left message for patient/parent to call back.      Questions for provider review:    None           Eli Sheikh, [facility]

## 2023-12-28 LAB — NONINV COLON CA DNA+OCC BLD SCRN STL QL: NEGATIVE

## 2024-04-04 ENCOUNTER — TELEPHONE (OUTPATIENT)
Dept: FAMILY MEDICINE | Facility: CLINIC | Age: 49
End: 2024-04-04
Payer: COMMERCIAL

## 2024-04-04 NOTE — TELEPHONE ENCOUNTER
Patient Quality Outreach    Patient is due for the following:   Asthma  -  ACT needed    Next Steps:   Patient needs to complete an ACT    Type of outreach:    Sent [employer] message.      Questions for provider review:    None           Eli Sheikh, CMA

## 2024-06-29 ENCOUNTER — HEALTH MAINTENANCE LETTER (OUTPATIENT)
Age: 49
End: 2024-06-29

## 2024-10-03 ENCOUNTER — TELEPHONE (OUTPATIENT)
Dept: FAMILY MEDICINE | Facility: CLINIC | Age: 49
End: 2024-10-03
Payer: COMMERCIAL

## 2024-10-03 NOTE — TELEPHONE ENCOUNTER
Patient Quality Outreach    Patient is due for the following:   Asthma  -  Asthma follow-up visit and AAP  Breast Cancer Screening - Mammogram  Cervical Cancer Screening - PAP Needed  Depression  -  PHQ-9 needed  Physical Preventive Adult Physical      Topic Date Due    Pneumococcal Vaccine (1 of 2 - PCV) Never done    Hepatitis B Vaccine (1 of 3 - 19+ 3-dose series) Never done    Diptheria Tetanus Pertussis (DTAP/TDAP/TD) Vaccine (2 - Tdap) 08/18/2010    Flu Vaccine (1) 09/01/2024    COVID-19 Vaccine (1 - 2024-25 season) Never done       Next Steps:   Schedule a Adult Preventative  Patient was assigned appropriate questionnaire to complete    Type of outreach:    Sent [employer] message.      Questions for provider review:    None           Bailee Kahler

## 2024-11-25 ENCOUNTER — TELEPHONE (OUTPATIENT)
Dept: FAMILY MEDICINE | Facility: CLINIC | Age: 49
End: 2024-11-25
Payer: COMMERCIAL

## 2024-11-25 NOTE — TELEPHONE ENCOUNTER
Patient Quality Outreach    Patient is due for the following:   Asthma  -  ACT needed  Physical Preventive Adult Physical    Action(s) Taken:   Schedule a Adult Preventative    Type of outreach:    Sent [employer] message.    Questions for provider review:    None           Nanci Sofia CMA

## 2025-05-11 ENCOUNTER — HEALTH MAINTENANCE LETTER (OUTPATIENT)
Age: 50
End: 2025-05-11

## 2025-07-13 ENCOUNTER — HEALTH MAINTENANCE LETTER (OUTPATIENT)
Age: 50
End: 2025-07-13